# Patient Record
Sex: MALE | Race: WHITE | HISPANIC OR LATINO | Employment: UNEMPLOYED | URBAN - METROPOLITAN AREA
[De-identification: names, ages, dates, MRNs, and addresses within clinical notes are randomized per-mention and may not be internally consistent; named-entity substitution may affect disease eponyms.]

---

## 2019-12-15 ENCOUNTER — APPOINTMENT (OUTPATIENT)
Dept: RADIOLOGY | Facility: MEDICAL CENTER | Age: 54
DRG: 580 | End: 2019-12-15
Attending: EMERGENCY MEDICINE
Payer: MEDICAID

## 2019-12-15 ENCOUNTER — HOSPITAL ENCOUNTER (INPATIENT)
Facility: MEDICAL CENTER | Age: 54
LOS: 8 days | DRG: 580 | End: 2019-12-23
Attending: EMERGENCY MEDICINE | Admitting: HOSPITALIST
Payer: MEDICAID

## 2019-12-15 DIAGNOSIS — L03.116 LEFT LEG CELLULITIS: ICD-10-CM

## 2019-12-15 DIAGNOSIS — L08.9 WOUND INFECTION: ICD-10-CM

## 2019-12-15 DIAGNOSIS — L24.A9 WOUND DRAINAGE: ICD-10-CM

## 2019-12-15 DIAGNOSIS — T14.8XXA WOUND INFECTION: ICD-10-CM

## 2019-12-15 DIAGNOSIS — F11.10 HEROIN ABUSE (HCC): ICD-10-CM

## 2019-12-15 DIAGNOSIS — R78.81 BACTEREMIA DUE TO METHICILLIN RESISTANT STAPHYLOCOCCUS AUREUS: ICD-10-CM

## 2019-12-15 DIAGNOSIS — L03.116 CELLULITIS OF LEFT LOWER EXTREMITY: ICD-10-CM

## 2019-12-15 DIAGNOSIS — B95.62 BACTEREMIA DUE TO METHICILLIN RESISTANT STAPHYLOCOCCUS AUREUS: ICD-10-CM

## 2019-12-15 PROBLEM — L02.416 CUTANEOUS ABSCESS OF LEFT LOWER EXTREMITY: Status: ACTIVE | Noted: 2019-12-15

## 2019-12-15 PROBLEM — L03.90 CELLULITIS: Status: ACTIVE | Noted: 2019-12-15

## 2019-12-15 PROBLEM — E87.1 HYPONATREMIA: Status: ACTIVE | Noted: 2019-12-15

## 2019-12-15 LAB
ALBUMIN SERPL BCP-MCNC: 3.4 G/DL (ref 3.2–4.9)
ALBUMIN/GLOB SERPL: 0.8 G/DL
ALP SERPL-CCNC: 58 U/L (ref 30–99)
ALT SERPL-CCNC: 24 U/L (ref 2–50)
ANION GAP SERPL CALC-SCNC: 6 MMOL/L (ref 0–11.9)
AST SERPL-CCNC: 27 U/L (ref 12–45)
BASOPHILS # BLD AUTO: 0.6 % (ref 0–1.8)
BASOPHILS # BLD: 0.06 K/UL (ref 0–0.12)
BILIRUB SERPL-MCNC: 0.9 MG/DL (ref 0.1–1.5)
BUN SERPL-MCNC: 16 MG/DL (ref 8–22)
CALCIUM SERPL-MCNC: 9.1 MG/DL (ref 8.5–10.5)
CHLORIDE SERPL-SCNC: 98 MMOL/L (ref 96–112)
CO2 SERPL-SCNC: 30 MMOL/L (ref 20–33)
CREAT SERPL-MCNC: 0.76 MG/DL (ref 0.5–1.4)
EOSINOPHIL # BLD AUTO: 0.18 K/UL (ref 0–0.51)
EOSINOPHIL NFR BLD: 1.7 % (ref 0–6.9)
ERYTHROCYTE [DISTWIDTH] IN BLOOD BY AUTOMATED COUNT: 43.8 FL (ref 35.9–50)
GLOBULIN SER CALC-MCNC: 4.4 G/DL (ref 1.9–3.5)
GLUCOSE SERPL-MCNC: 122 MG/DL (ref 65–99)
GRAM STN SPEC: NORMAL
HCT VFR BLD AUTO: 38.6 % (ref 42–52)
HGB BLD-MCNC: 12.7 G/DL (ref 14–18)
IMM GRANULOCYTES # BLD AUTO: 0.13 K/UL (ref 0–0.11)
IMM GRANULOCYTES NFR BLD AUTO: 1.2 % (ref 0–0.9)
LACTATE BLD-SCNC: 1.2 MMOL/L (ref 0.5–2)
LACTATE BLD-SCNC: 1.4 MMOL/L (ref 0.5–2)
LYMPHOCYTES # BLD AUTO: 1.24 K/UL (ref 1–4.8)
LYMPHOCYTES NFR BLD: 11.9 % (ref 22–41)
MCH RBC QN AUTO: 29.6 PG (ref 27–33)
MCHC RBC AUTO-ENTMCNC: 32.9 G/DL (ref 33.7–35.3)
MCV RBC AUTO: 90 FL (ref 81.4–97.8)
MONOCYTES # BLD AUTO: 0.76 K/UL (ref 0–0.85)
MONOCYTES NFR BLD AUTO: 7.3 % (ref 0–13.4)
NEUTROPHILS # BLD AUTO: 8.08 K/UL (ref 1.82–7.42)
NEUTROPHILS NFR BLD: 77.3 % (ref 44–72)
NRBC # BLD AUTO: 0 K/UL
NRBC BLD-RTO: 0 /100 WBC
PLATELET # BLD AUTO: 275 K/UL (ref 164–446)
PMV BLD AUTO: 10 FL (ref 9–12.9)
POTASSIUM SERPL-SCNC: 4.2 MMOL/L (ref 3.6–5.5)
PROT SERPL-MCNC: 7.8 G/DL (ref 6–8.2)
RBC # BLD AUTO: 4.29 M/UL (ref 4.7–6.1)
SIGNIFICANT IND 70042: NORMAL
SITE SITE: NORMAL
SODIUM SERPL-SCNC: 134 MMOL/L (ref 135–145)
SOURCE SOURCE: NORMAL
WBC # BLD AUTO: 10.5 K/UL (ref 4.8–10.8)

## 2019-12-15 PROCEDURE — 700111 HCHG RX REV CODE 636 W/ 250 OVERRIDE (IP): Performed by: EMERGENCY MEDICINE

## 2019-12-15 PROCEDURE — 700117 HCHG RX CONTRAST REV CODE 255: Performed by: EMERGENCY MEDICINE

## 2019-12-15 PROCEDURE — 700111 HCHG RX REV CODE 636 W/ 250 OVERRIDE (IP): Performed by: HOSPITALIST

## 2019-12-15 PROCEDURE — 85025 COMPLETE CBC W/AUTO DIFF WBC: CPT

## 2019-12-15 PROCEDURE — 87150 DNA/RNA AMPLIFIED PROBE: CPT | Mod: 91

## 2019-12-15 PROCEDURE — 96365 THER/PROPH/DIAG IV INF INIT: CPT

## 2019-12-15 PROCEDURE — 87070 CULTURE OTHR SPECIMN AEROBIC: CPT

## 2019-12-15 PROCEDURE — 99223 1ST HOSP IP/OBS HIGH 75: CPT | Performed by: HOSPITALIST

## 2019-12-15 PROCEDURE — 87077 CULTURE AEROBIC IDENTIFY: CPT | Mod: 91

## 2019-12-15 PROCEDURE — 87040 BLOOD CULTURE FOR BACTERIA: CPT

## 2019-12-15 PROCEDURE — 96366 THER/PROPH/DIAG IV INF ADDON: CPT

## 2019-12-15 PROCEDURE — A9270 NON-COVERED ITEM OR SERVICE: HCPCS | Performed by: HOSPITALIST

## 2019-12-15 PROCEDURE — 73701 CT LOWER EXTREMITY W/DYE: CPT | Mod: LT

## 2019-12-15 PROCEDURE — 36415 COLL VENOUS BLD VENIPUNCTURE: CPT

## 2019-12-15 PROCEDURE — 700105 HCHG RX REV CODE 258: Performed by: HOSPITALIST

## 2019-12-15 PROCEDURE — 87186 SC STD MICRODIL/AGAR DIL: CPT | Mod: 91

## 2019-12-15 PROCEDURE — 80053 COMPREHEN METABOLIC PANEL: CPT

## 2019-12-15 PROCEDURE — 99285 EMERGENCY DEPT VISIT HI MDM: CPT

## 2019-12-15 PROCEDURE — 87205 SMEAR GRAM STAIN: CPT

## 2019-12-15 PROCEDURE — 700102 HCHG RX REV CODE 250 W/ 637 OVERRIDE(OP): Performed by: HOSPITALIST

## 2019-12-15 PROCEDURE — 83605 ASSAY OF LACTIC ACID: CPT

## 2019-12-15 PROCEDURE — 700105 HCHG RX REV CODE 258: Performed by: EMERGENCY MEDICINE

## 2019-12-15 PROCEDURE — 770006 HCHG ROOM/CARE - MED/SURG/GYN SEMI*

## 2019-12-15 PROCEDURE — 96367 TX/PROPH/DG ADDL SEQ IV INF: CPT

## 2019-12-15 RX ORDER — AMOXICILLIN 250 MG
2 CAPSULE ORAL 2 TIMES DAILY
Status: DISCONTINUED | OUTPATIENT
Start: 2019-12-15 | End: 2019-12-23 | Stop reason: HOSPADM

## 2019-12-15 RX ORDER — PROCHLORPERAZINE EDISYLATE 5 MG/ML
5-10 INJECTION INTRAMUSCULAR; INTRAVENOUS EVERY 4 HOURS PRN
Status: DISCONTINUED | OUTPATIENT
Start: 2019-12-15 | End: 2019-12-23 | Stop reason: HOSPADM

## 2019-12-15 RX ORDER — OXYCODONE HYDROCHLORIDE 5 MG/1
5 TABLET ORAL
Status: DISCONTINUED | OUTPATIENT
Start: 2019-12-15 | End: 2019-12-23 | Stop reason: HOSPADM

## 2019-12-15 RX ORDER — BISACODYL 10 MG
10 SUPPOSITORY, RECTAL RECTAL
Status: DISCONTINUED | OUTPATIENT
Start: 2019-12-15 | End: 2019-12-23 | Stop reason: HOSPADM

## 2019-12-15 RX ORDER — OXYCODONE HYDROCHLORIDE 10 MG/1
10 TABLET ORAL
Status: DISCONTINUED | OUTPATIENT
Start: 2019-12-15 | End: 2019-12-23 | Stop reason: HOSPADM

## 2019-12-15 RX ORDER — ONDANSETRON 2 MG/ML
4 INJECTION INTRAMUSCULAR; INTRAVENOUS EVERY 4 HOURS PRN
Status: DISCONTINUED | OUTPATIENT
Start: 2019-12-15 | End: 2019-12-23 | Stop reason: HOSPADM

## 2019-12-15 RX ORDER — PROMETHAZINE HYDROCHLORIDE 25 MG/1
12.5-25 TABLET ORAL EVERY 4 HOURS PRN
Status: DISCONTINUED | OUTPATIENT
Start: 2019-12-15 | End: 2019-12-23 | Stop reason: HOSPADM

## 2019-12-15 RX ORDER — ONDANSETRON 4 MG/1
4 TABLET, ORALLY DISINTEGRATING ORAL EVERY 4 HOURS PRN
Status: DISCONTINUED | OUTPATIENT
Start: 2019-12-15 | End: 2019-12-23 | Stop reason: HOSPADM

## 2019-12-15 RX ORDER — MORPHINE SULFATE 4 MG/ML
4 INJECTION, SOLUTION INTRAMUSCULAR; INTRAVENOUS
Status: DISCONTINUED | OUTPATIENT
Start: 2019-12-15 | End: 2019-12-23 | Stop reason: HOSPADM

## 2019-12-15 RX ORDER — ACETAMINOPHEN 325 MG/1
650 TABLET ORAL EVERY 6 HOURS PRN
Status: DISCONTINUED | OUTPATIENT
Start: 2019-12-15 | End: 2019-12-23 | Stop reason: HOSPADM

## 2019-12-15 RX ORDER — POLYETHYLENE GLYCOL 3350 17 G/17G
1 POWDER, FOR SOLUTION ORAL
Status: DISCONTINUED | OUTPATIENT
Start: 2019-12-15 | End: 2019-12-23 | Stop reason: HOSPADM

## 2019-12-15 RX ORDER — PROMETHAZINE HYDROCHLORIDE 25 MG/1
12.5-25 SUPPOSITORY RECTAL EVERY 4 HOURS PRN
Status: DISCONTINUED | OUTPATIENT
Start: 2019-12-15 | End: 2019-12-23 | Stop reason: HOSPADM

## 2019-12-15 RX ADMIN — VANCOMYCIN HYDROCHLORIDE 2400 MG: 500 INJECTION, POWDER, LYOPHILIZED, FOR SOLUTION INTRAVENOUS at 13:24

## 2019-12-15 RX ADMIN — IOHEXOL 80 ML: 350 INJECTION, SOLUTION INTRAVENOUS at 13:50

## 2019-12-15 RX ADMIN — AMPICILLIN SODIUM AND SULBACTAM SODIUM 3 G: 2; 1 INJECTION, POWDER, FOR SOLUTION INTRAMUSCULAR; INTRAVENOUS at 19:03

## 2019-12-15 RX ADMIN — OXYCODONE HYDROCHLORIDE 5 MG: 5 TABLET ORAL at 16:41

## 2019-12-15 RX ADMIN — SENNOSIDES AND DOCUSATE SODIUM 2 TABLET: 8.6; 5 TABLET ORAL at 16:44

## 2019-12-15 RX ADMIN — ENOXAPARIN SODIUM 40 MG: 100 INJECTION SUBCUTANEOUS at 16:41

## 2019-12-15 RX ADMIN — AMPICILLIN SODIUM AND SULBACTAM SODIUM 3 G: 2; 1 INJECTION, POWDER, FOR SOLUTION INTRAMUSCULAR; INTRAVENOUS at 11:54

## 2019-12-15 RX ADMIN — OXYCODONE HYDROCHLORIDE 5 MG: 5 TABLET ORAL at 19:49

## 2019-12-15 RX ADMIN — OXYCODONE HYDROCHLORIDE 10 MG: 10 TABLET ORAL at 22:59

## 2019-12-15 SDOH — HEALTH STABILITY: MENTAL HEALTH: HOW OFTEN DO YOU HAVE A DRINK CONTAINING ALCOHOL?: NEVER

## 2019-12-15 ASSESSMENT — LIFESTYLE VARIABLES
HAVE PEOPLE ANNOYED YOU BY CRITICIZING YOUR DRINKING: NO
TOTAL SCORE: 0
TOTAL SCORE: 0
EVER HAD A DRINK FIRST THING IN THE MORNING TO STEADY YOUR NERVES TO GET RID OF A HANGOVER: NO
EVER FELT BAD OR GUILTY ABOUT YOUR DRINKING: NO
TOTAL SCORE: 0
AVERAGE NUMBER OF DAYS PER WEEK YOU HAVE A DRINK CONTAINING ALCOHOL: 0
EVER_SMOKED: NEVER
ON A TYPICAL DAY WHEN YOU DRINK ALCOHOL HOW MANY DRINKS DO YOU HAVE: 0
HOW MANY TIMES IN THE PAST YEAR HAVE YOU HAD 5 OR MORE DRINKS IN A DAY: 0
HAVE YOU EVER FELT YOU SHOULD CUT DOWN ON YOUR DRINKING: NO
ALCOHOL_USE: NO
CONSUMPTION TOTAL: NEGATIVE

## 2019-12-15 ASSESSMENT — ENCOUNTER SYMPTOMS
HEMOPTYSIS: 0
VOMITING: 0
SPUTUM PRODUCTION: 0
PALPITATIONS: 0
COUGH: 0
DIZZINESS: 0
NAUSEA: 0
CHILLS: 0
ORTHOPNEA: 0

## 2019-12-15 ASSESSMENT — COGNITIVE AND FUNCTIONAL STATUS - GENERAL
SUGGESTED CMS G CODE MODIFIER DAILY ACTIVITY: CI
MOBILITY SCORE: 23
MOVING FROM LYING ON BACK TO SITTING ON SIDE OF FLAT BED: A LITTLE
SUGGESTED CMS G CODE MODIFIER MOBILITY: CI
DRESSING REGULAR LOWER BODY CLOTHING: A LITTLE
DAILY ACTIVITIY SCORE: 23

## 2019-12-15 ASSESSMENT — PATIENT HEALTH QUESTIONNAIRE - PHQ9
SUM OF ALL RESPONSES TO PHQ9 QUESTIONS 1 AND 2: 0
1. LITTLE INTEREST OR PLEASURE IN DOING THINGS: NOT AT ALL
2. FEELING DOWN, DEPRESSED, IRRITABLE, OR HOPELESS: NOT AT ALL

## 2019-12-15 NOTE — PROGRESS NOTES
"Pharmacy Kinetics 53 y.o. male on vancomycin day # 1   12/15/2019    Received Vancomycin 2,400 mg iv loading dose at 13:24 PM on 12/15  Provider specified end date: 19    Indication for Treatment: cellulitis     Pertinent history per medical record: Admitted on 12/15/2019 for SSTI.    Jaime Gunderson is a 53 y.o. male who presented to the ER with cellulitis of the left leg. Her LLE is erythematous and swollen with purulent drainage from his wound.  The patient reports being an IVDU and injects into his medial left thigh, where there are two open weaping wounds. He also has a healing abscess on his right hand where he has been injecting drugs. A CT scan of the LEFT lower extremity was obtained and showed extensive soft tissue induration and/or edema throughout the subcutaneous fat and intramuscular fat planes (no soft tissue abscess or bone erosion identified).    Other antibiotics: ampicillin-sulbactam 3g IV Q6h     Allergies: Patient has no known allergies.     List concerns for renal function: age, IV contrast 12/15     Pertinent cultures to date:   12/15: Blood culture - in process   12/15: Blood culture - in process   12/15: Left leg wound culture - in process     MRSA nares swab if pneumonia is a concern (ordered/positive/negative/n-a): n-a    Recent Labs     12/15/19  1130   WBC 10.5   NEUTSPOLYS 77.30*     Recent Labs     12/15/19  1130   BUN 16   CREATININE 0.76   ALBUMIN 3.4       Intake/Output Summary (Last 24 hours) at 12/15/2019 1527  Last data filed at 12/15/2019 1231  Gross per 24 hour   Intake 100 ml   Output --   Net 100 ml      /64   Pulse 71   Temp 37 °C (98.6 °F) (Temporal)   Resp 20   Ht 1.88 m (6' 2\")   Wt 94.3 kg (208 lb)   SpO2 96%  Temp (24hrs), Av °C (98.6 °F), Min:37 °C (98.6 °F), Max:37 °C (98.6 °F)      A/P   1. Vancomycin dose change: Vancomycin 1,600 mg IV Q12h (01:30, 13:30)  2. Next vancomycin level: Trough prior to the 3rd or 4th total dose (not yet " ordered)  3. Goal trough: 12-16 mcg/mL  4. Comments: Vancomycin ~17 mg/kg IV Q12h initiated for SSTI in an IVDU patient. Concerns for renal accumulation of vancomycin listed above. A trough will be obtained at steady state. Pharmacy will continue to follow and recommend de-escalation of antibiotics as appropriate.     Radha Braun, PharmD, BCPS

## 2019-12-15 NOTE — ED TRIAGE NOTES
Chief Complaint   Patient presents with   • Wound Check     Pt has possible abscess on right forearm.  Pt has two large, open wounds on inside of left thigh with large amount of purulent discharge.  Pt states that he has been using these sites to inject heroin.  Triage process explained to patient.  Pt back to waiting room.  Pt instructed to inform RN if any changes or questions arise.

## 2019-12-15 NOTE — ASSESSMENT & PLAN NOTE
-CT scan negative for abscess or osteomyelitis.   -ID following. Continue abx per their recs.   -Clinically improving. Continue to monitor progression.

## 2019-12-15 NOTE — ED PROVIDER NOTES
ED Provider Note    CHIEF COMPLAINT  Chief Complaint   Patient presents with   • Wound Check       HPI  Jaime Gunderson is a 53 y.o. male who presents with several areas of infection, left lower leg erythematous and swollen with purulent drainage from anterior abrasion/wound.  He states this was a chronic wound that has become infected, occurring months ago when he kicked his shin into a solid object.  He has been injecting his medial left thigh, he has 2 open wounds in this area with purulent drainage and surrounding erythema, associated pain.  He has a healing abscess right hand, again an area where he has been injecting drugs.  No chest pain, no shortness of breath.  Patient states he was told he would need medical clearance prior to undergoing rehab through [facility].  He is here for medical evaluation.    REVIEW OF SYSTEMS  Constitutional: No fever  Respiratory: No shortness of breath  Cardiac: No chest pain or syncope  Gastrointestinal: No abdominal pain  Musculoskeletal: Left leg pain  Neurologic: No headache  Skin: Erythematous change, swelling, prior shooters abscess, purulent drainage       All other systems are negative.     PAST MEDICAL HISTORY  History reviewed. No pertinent past medical history.    FAMILY HISTORY  History reviewed. No pertinent family history.    SOCIAL HISTORY  Social History     Socioeconomic History   • Marital status: Single     Spouse name: Not on file   • Number of children: Not on file   • Years of education: Not on file   • Highest education level: Not on file   Occupational History   • Not on file   Social Needs   • Financial resource strain: Not on file   • Food insecurity:     Worry: Not on file     Inability: Not on file   • Transportation needs:     Medical: Not on file     Non-medical: Not on file   Tobacco Use   • Smoking status: Never Smoker   • Smokeless tobacco: Never Used   Substance and Sexual Activity   • Alcohol use: Never      "Frequency: Never   • Drug use: Yes     Types: Intravenous     Comment: heroin   • Sexual activity: Not on file   Lifestyle   • Physical activity:     Days per week: Not on file     Minutes per session: Not on file   • Stress: Not on file   Relationships   • Social connections:     Talks on phone: Not on file     Gets together: Not on file     Attends Sikhism service: Not on file     Active member of club or organization: Not on file     Attends meetings of clubs or organizations: Not on file     Relationship status: Not on file   • Intimate partner violence:     Fear of current or ex partner: Not on file     Emotionally abused: Not on file     Physically abused: Not on file     Forced sexual activity: Not on file   Other Topics Concern   • Not on file   Social History Narrative   • Not on file       SURGICAL HISTORY  History reviewed. No pertinent surgical history.    CURRENT MEDICATIONS  Home Medications     Reviewed by Porsche Hadley (Pharmacy Tech) on 12/15/19 at 1342  Med List Status: Complete   Medication Last Dose Status        Patient Mario Taking any Medications                       ALLERGIES  No Known Allergies    PHYSICAL EXAM  VITAL SIGNS: /64   Pulse 85   Temp 37 °C (98.6 °F) (Temporal)   Resp 20   Ht 1.88 m (6' 2\")   Wt 94.3 kg (208 lb)   SpO2 97%   BMI 26.71 kg/m²   Constitutional: Nontoxic appearance  ENT: Nares clear, mucous membranes moist.  Eyes:  Conjunctiva normal, No discharge.    Lymphatic: No adenopathy.   Cardiovascular: Normal heart rate, Normal rhythm.  No heart murmur.  Pulmonary: No wheezing, no rales  Gastrointestinal: Abdomen is soft and nontender  Skin: Erythematous change in swelling left leg.  2 circular wounds medial left thigh, appear to be infected.  No palpable fluctuant mass.  Wound anterior left shin with purulent material.  Right hand has circular wound, eschar present, no fluctuant mass to the right hand..   Musculoskeletal:  No CVA tenderness.  Left leg " tenderness  Neurologic:  Normal motor and sensory function, No focal deficits noted.   Psychiatric: Cooperative, normal mood    RADIOLOGY/PROCEDURES/Labs  Results for orders placed or performed during the hospital encounter of 12/15/19   CBC WITH DIFFERENTIAL   Result Value Ref Range    WBC 10.5 4.8 - 10.8 K/uL    RBC 4.29 (L) 4.70 - 6.10 M/uL    Hemoglobin 12.7 (L) 14.0 - 18.0 g/dL    Hematocrit 38.6 (L) 42.0 - 52.0 %    MCV 90.0 81.4 - 97.8 fL    MCH 29.6 27.0 - 33.0 pg    MCHC 32.9 (L) 33.7 - 35.3 g/dL    RDW 43.8 35.9 - 50.0 fL    Platelet Count 275 164 - 446 K/uL    MPV 10.0 9.0 - 12.9 fL    Neutrophils-Polys 77.30 (H) 44.00 - 72.00 %    Lymphocytes 11.90 (L) 22.00 - 41.00 %    Monocytes 7.30 0.00 - 13.40 %    Eosinophils 1.70 0.00 - 6.90 %    Basophils 0.60 0.00 - 1.80 %    Immature Granulocytes 1.20 (H) 0.00 - 0.90 %    Nucleated RBC 0.00 /100 WBC    Neutrophils (Absolute) 8.08 (H) 1.82 - 7.42 K/uL    Lymphs (Absolute) 1.24 1.00 - 4.80 K/uL    Monos (Absolute) 0.76 0.00 - 0.85 K/uL    Eos (Absolute) 0.18 0.00 - 0.51 K/uL    Baso (Absolute) 0.06 0.00 - 0.12 K/uL    Immature Granulocytes (abs) 0.13 (H) 0.00 - 0.11 K/uL    NRBC (Absolute) 0.00 K/uL   COMP METABOLIC PANEL   Result Value Ref Range    Sodium 134 (L) 135 - 145 mmol/L    Potassium 4.2 3.6 - 5.5 mmol/L    Chloride 98 96 - 112 mmol/L    Co2 30 20 - 33 mmol/L    Anion Gap 6.0 0.0 - 11.9    Glucose 122 (H) 65 - 99 mg/dL    Bun 16 8 - 22 mg/dL    Creatinine 0.76 0.50 - 1.40 mg/dL    Calcium 9.1 8.5 - 10.5 mg/dL    AST(SGOT) 27 12 - 45 U/L    ALT(SGPT) 24 2 - 50 U/L    Alkaline Phosphatase 58 30 - 99 U/L    Total Bilirubin 0.9 0.1 - 1.5 mg/dL    Albumin 3.4 3.2 - 4.9 g/dL    Total Protein 7.8 6.0 - 8.2 g/dL    Globulin 4.4 (H) 1.9 - 3.5 g/dL    A-G Ratio 0.8 g/dL   LACTIC ACID   Result Value Ref Range    Lactic Acid 1.2 0.5 - 2.0 mmol/L   LACTIC ACID   Result Value Ref Range    Lactic Acid 1.4 0.5 - 2.0 mmol/L   ESTIMATED GFR   Result Value Ref Range     GFR If African American >60 >60 mL/min/1.73 m 2    GFR If Non African American >60 >60 mL/min/1.73 m 2     CT-EXTREMITY, LOWER WITH LEFT    (Results Pending)         COURSE & MEDICAL DECISION MAKING  Pertinent Labs & Imaging studies reviewed. (See chart for details)  CT scan is obtained of the left leg to rule out deep abscess.  Externally the areas of infection appear to be draining.  Patient started on vancomycin and Unasyn.  Patient has negative lactic acid, normalized vital signs, does not appear septic at this time.  Patient is admitted for IV antibiotic therapy, and ongoing evaluation with pending CT scan of the left leg    FINAL IMPRESSION  1. Wound infection     2. Wound drainage     3. Left leg cellulitis             Electronically signed by: Jony Lopez, 12/15/2019 1:54 PM

## 2019-12-15 NOTE — H&P
Hospital Medicine History & Physical Note    Date of Service  12/15/2019    Primary Care Physician  No primary care provider on file.    Consultants  None    Code Status  Full Code    Chief Complaint  Left leg swelling    History of Presenting Illness  53 y.o. male who presented 12/15/2019 with left leg swelling.    Mr. Gunderson has a history of heroin abuse.  The patient had an injury which occurred approximately a year and a half ago when he injured his left lower leg after bumping into an air conditioner.  This resulted in a nonhealing wound that has caused him quite a bit of trouble.  He in fact blames pain from the wound as the cause of his heroin dependence.  Over this time the wound has occasionally drained blood and purulent-like material.  The area around it has really never been swollen and his leg was not erythematous.  In the past month the patient has developed a more extensive infection of his leg.  He admits to shooting heroin in his medial thigh and he developed 2 large abscesses there which he opened himself.  They are draining purulent material and are extremely painful.  He has developed progressive swelling and discoloration of his entire leg during this time.  He is having difficulty walking.  Pain is relieved with heroin and when he elevates his leg.  He endorses malaise but has not measured a fever at home.    Review of Systems  Review of Systems   Constitutional: Positive for malaise/fatigue. Negative for chills.   Respiratory: Negative for cough, hemoptysis and sputum production.    Cardiovascular: Negative for chest pain, palpitations and orthopnea.   Gastrointestinal: Negative for nausea and vomiting.   Skin: Negative for itching and rash.   Neurological: Negative for dizziness.   All other systems reviewed and are negative.      Past Medical History  None    Surgical History   has no past surgical history on file.     Family History  Mother with diabetes    Social History   reports that he  has never smoked. He has never used smokeless tobacco. He reports current drug use. Drug: Intravenous. He reports that he does not drink alcohol.    Allergies  No Known Allergies    Medications  None       Physical Exam  Temp:  [37 °C (98.6 °F)] 37 °C (98.6 °F)  Pulse:  [76-88] 77  Resp:  [16-22] 22  BP: (106-129)/(58-78) 110/65  SpO2:  [93 %-97 %] 96 %    Physical Exam  Constitutional:       General: He is not in acute distress.     Appearance: Normal appearance. He is normal weight.   HENT:      Head: Normocephalic and atraumatic.      Right Ear: External ear normal.      Left Ear: External ear normal.      Nose: Nose normal.      Mouth/Throat:      Mouth: Mucous membranes are moist.      Pharynx: Oropharynx is clear.   Eyes:      Extraocular Movements: Extraocular movements intact.      Conjunctiva/sclera: Conjunctivae normal.      Pupils: Pupils are equal, round, and reactive to light.   Neck:      Musculoskeletal: Normal range of motion and neck supple.   Cardiovascular:      Rate and Rhythm: Normal rate and regular rhythm.      Pulses: Normal pulses.   Pulmonary:      Effort: Pulmonary effort is normal.      Breath sounds: Normal breath sounds.   Abdominal:      General: Abdomen is flat. Bowel sounds are normal.      Palpations: Abdomen is soft.   Musculoskeletal: Normal range of motion.   Skin:     General: Skin is warm.      Capillary Refill: Capillary refill takes less than 2 seconds.      Coloration: Skin is not jaundiced.      Findings: Erythema present.      Comments: Left lower extremity swollen from mid thigh to ankle  It is warm with purplish discoloration  There are two 2 cm draining abscesses in his medial thigh  Midpoint of his shin there is a chronic appearing wound   Neurological:      General: No focal deficit present.      Mental Status: He is alert and oriented to person, place, and time.      Cranial Nerves: No cranial nerve deficit.      Gait: Gait normal.   Psychiatric:         Mood and  "Affect: Mood normal.         Behavior: Behavior normal.         Laboratory:  Recent Labs     12/15/19  1130   WBC 10.5   RBC 4.29*   HEMOGLOBIN 12.7*   HEMATOCRIT 38.6*   MCV 90.0   MCH 29.6   MCHC 32.9*   RDW 43.8   PLATELETCT 275   MPV 10.0     Recent Labs     12/15/19  1130   SODIUM 134*   POTASSIUM 4.2   CHLORIDE 98   CO2 30   GLUCOSE 122*   BUN 16   CREATININE 0.76   CALCIUM 9.1     Recent Labs     12/15/19  1130   ALTSGPT 24   ASTSGOT 27   ALKPHOSPHAT 58   TBILIRUBIN 0.9   GLUCOSE 122*         No results for input(s): NTPROBNP in the last 72 hours.      No results for input(s): TROPONINT in the last 72 hours.    Urinalysis:    No results found     Imaging:  CT-EXTREMITY, LOWER WITH LEFT   Final Result      1.  Extensive soft tissue induration and/or edema is identified throughout the subcutaneous fat and intramuscular fat planes of the left lower extremity. Findings could be due to cellulitis or edema.      2.  No soft tissue abscess is appreciated.      3.  No bone erosion is identified that would suggest osteomyelitis.      4.  Prominent dilated superficial veins are identified likely related to congestion inflammation or infection.      5.  Enlarged left inguinal lymph node is identified likely due to inflammation or infection.            Assessment/Plan:  I anticipate this patient will require at least two midnights for appropriate medical management, necessitating inpatient admission.    * Cellulitis- (present on admission)  Assessment & Plan  Patient has extensive circumferential cellulitis  He is at risk for infection with resistant organism including MRSA  Admit to inpatient status  IV Unasyn and vancomycin  Dose of vancomycin will be titrated to serum concentration levels to ensure adequate levels and reduce risk of toxicity  Check lower extremity arterial study    Results of CT scan 12/15/2019 reviewed:  \"1.  Extensive soft tissue induration and/or edema is identified throughout the subcutaneous fat " "and intramuscular fat planes of the left lower extremity. Findings could be due to cellulitis or edema.     2.  No soft tissue abscess is appreciated.     3.  No bone erosion is identified that would suggest osteomyelitis.     4.  Prominent dilated superficial veins are identified likely related to congestion inflammation or infection.     5.  Enlarged left inguinal lymph node is identified likely due to inflammation or infection.\"    Cutaneous abscess of left lower extremity- (present on admission)  Assessment & Plan  Patient has 2 relatively large cutaneous abscess on his medial thigh  He opened them himself, they are draining purulent material  CT scan does not demonstrate any sizable abscess  Monitor closely, he may need a formal I&D    Hyponatremia- (present on admission)  Assessment & Plan  Suspect hypovolemic hyponatremia  IV fluids  I have ordered repeat labs to reassess sodium level after hydration    Heroin abuse (HCC)- (present on admission)  Assessment & Plan  Cessation discussed  Patient uses heroin 4 times a day, we discussed that withdrawal during his hospitalization is expected  He will be treated with oxycodone for pain      VTE prophylaxis: lovenox  "

## 2019-12-15 NOTE — ASSESSMENT & PLAN NOTE
Suspect hypovolemic hyponatremia  IV fluids  I have ordered repeat labs to reassess sodium level after hydration

## 2019-12-15 NOTE — ASSESSMENT & PLAN NOTE
-Continue to reiterate the importance of cessation.   -Withdrawal symptoms controlled on twice daily methadone.  Continue to reassess.

## 2019-12-16 ENCOUNTER — APPOINTMENT (OUTPATIENT)
Dept: RADIOLOGY | Facility: MEDICAL CENTER | Age: 54
DRG: 580 | End: 2019-12-16
Attending: NURSE PRACTITIONER
Payer: MEDICAID

## 2019-12-16 ENCOUNTER — APPOINTMENT (OUTPATIENT)
Dept: RADIOLOGY | Facility: MEDICAL CENTER | Age: 54
DRG: 580 | End: 2019-12-16
Attending: HOSPITALIST
Payer: MEDICAID

## 2019-12-16 PROBLEM — R78.81 BACTEREMIA DUE TO METHICILLIN RESISTANT STAPHYLOCOCCUS AUREUS: Status: ACTIVE | Noted: 2019-12-16

## 2019-12-16 PROBLEM — T14.8XXA WOUND INFECTION: Status: ACTIVE | Noted: 2019-12-15

## 2019-12-16 PROBLEM — B95.62 BACTEREMIA DUE TO METHICILLIN RESISTANT STAPHYLOCOCCUS AUREUS: Status: ACTIVE | Noted: 2019-12-16

## 2019-12-16 PROBLEM — E87.1 HYPONATREMIA: Status: RESOLVED | Noted: 2019-12-15 | Resolved: 2019-12-16

## 2019-12-16 PROBLEM — L08.9 WOUND INFECTION: Status: ACTIVE | Noted: 2019-12-15

## 2019-12-16 LAB
ANION GAP SERPL CALC-SCNC: 9 MMOL/L (ref 0–11.9)
BASOPHILS # BLD AUTO: 0.6 % (ref 0–1.8)
BASOPHILS # BLD: 0.05 K/UL (ref 0–0.12)
BUN SERPL-MCNC: 11 MG/DL (ref 8–22)
CALCIUM SERPL-MCNC: 8.9 MG/DL (ref 8.5–10.5)
CHLORIDE SERPL-SCNC: 106 MMOL/L (ref 96–112)
CO2 SERPL-SCNC: 25 MMOL/L (ref 20–33)
CREAT SERPL-MCNC: 0.63 MG/DL (ref 0.5–1.4)
EOSINOPHIL # BLD AUTO: 0.17 K/UL (ref 0–0.51)
EOSINOPHIL NFR BLD: 2.2 % (ref 0–6.9)
ERYTHROCYTE [DISTWIDTH] IN BLOOD BY AUTOMATED COUNT: 44 FL (ref 35.9–50)
GLUCOSE SERPL-MCNC: 102 MG/DL (ref 65–99)
HAV IGM SERPL QL IA: NEGATIVE
HBV CORE IGM SER QL: NEGATIVE
HBV SURFACE AG SER QL: NEGATIVE
HCT VFR BLD AUTO: 40.8 % (ref 42–52)
HCV AB SER QL: REACTIVE
HGB BLD-MCNC: 13.6 G/DL (ref 14–18)
HIV 1+2 AB+HIV1 P24 AG SERPL QL IA: NON REACTIVE
IMM GRANULOCYTES # BLD AUTO: 0.13 K/UL (ref 0–0.11)
IMM GRANULOCYTES NFR BLD AUTO: 1.7 % (ref 0–0.9)
LYMPHOCYTES # BLD AUTO: 1.38 K/UL (ref 1–4.8)
LYMPHOCYTES NFR BLD: 17.9 % (ref 22–41)
MCH RBC QN AUTO: 29.7 PG (ref 27–33)
MCHC RBC AUTO-ENTMCNC: 33.3 G/DL (ref 33.7–35.3)
MCV RBC AUTO: 89.1 FL (ref 81.4–97.8)
MONOCYTES # BLD AUTO: 0.76 K/UL (ref 0–0.85)
MONOCYTES NFR BLD AUTO: 9.9 % (ref 0–13.4)
NEUTROPHILS # BLD AUTO: 5.21 K/UL (ref 1.82–7.42)
NEUTROPHILS NFR BLD: 67.7 % (ref 44–72)
NRBC # BLD AUTO: 0 K/UL
NRBC BLD-RTO: 0 /100 WBC
PLATELET # BLD AUTO: 257 K/UL (ref 164–446)
PMV BLD AUTO: 10.1 FL (ref 9–12.9)
POTASSIUM SERPL-SCNC: 4 MMOL/L (ref 3.6–5.5)
RBC # BLD AUTO: 4.58 M/UL (ref 4.7–6.1)
SODIUM SERPL-SCNC: 140 MMOL/L (ref 135–145)
WBC # BLD AUTO: 7.7 K/UL (ref 4.8–10.8)

## 2019-12-16 PROCEDURE — 72158 MRI LUMBAR SPINE W/O & W/DYE: CPT

## 2019-12-16 PROCEDURE — 700117 HCHG RX CONTRAST REV CODE 255: Performed by: NURSE PRACTITIONER

## 2019-12-16 PROCEDURE — 770006 HCHG ROOM/CARE - MED/SURG/GYN SEMI*

## 2019-12-16 PROCEDURE — 85025 COMPLETE CBC W/AUTO DIFF WBC: CPT

## 2019-12-16 PROCEDURE — 99233 SBSQ HOSP IP/OBS HIGH 50: CPT | Performed by: HOSPITALIST

## 2019-12-16 PROCEDURE — 93922 UPR/L XTREMITY ART 2 LEVELS: CPT | Mod: 26 | Performed by: INTERNAL MEDICINE

## 2019-12-16 PROCEDURE — 36415 COLL VENOUS BLD VENIPUNCTURE: CPT

## 2019-12-16 PROCEDURE — 700111 HCHG RX REV CODE 636 W/ 250 OVERRIDE (IP): Performed by: HOSPITALIST

## 2019-12-16 PROCEDURE — 80048 BASIC METABOLIC PNL TOTAL CA: CPT

## 2019-12-16 PROCEDURE — 87522 HEPATITIS C REVRS TRNSCRPJ: CPT

## 2019-12-16 PROCEDURE — A9270 NON-COVERED ITEM OR SERVICE: HCPCS | Performed by: HOSPITALIST

## 2019-12-16 PROCEDURE — 99255 IP/OBS CONSLTJ NEW/EST HI 80: CPT | Performed by: INTERNAL MEDICINE

## 2019-12-16 PROCEDURE — A9270 NON-COVERED ITEM OR SERVICE: HCPCS | Performed by: NURSE PRACTITIONER

## 2019-12-16 PROCEDURE — 700102 HCHG RX REV CODE 250 W/ 637 OVERRIDE(OP): Performed by: NURSE PRACTITIONER

## 2019-12-16 PROCEDURE — 700105 HCHG RX REV CODE 258: Performed by: HOSPITALIST

## 2019-12-16 PROCEDURE — A9576 INJ PROHANCE MULTIPACK: HCPCS | Performed by: NURSE PRACTITIONER

## 2019-12-16 PROCEDURE — 87389 HIV-1 AG W/HIV-1&-2 AB AG IA: CPT

## 2019-12-16 PROCEDURE — 700102 HCHG RX REV CODE 250 W/ 637 OVERRIDE(OP): Performed by: HOSPITALIST

## 2019-12-16 PROCEDURE — 93922 UPR/L XTREMITY ART 2 LEVELS: CPT

## 2019-12-16 PROCEDURE — 80074 ACUTE HEPATITIS PANEL: CPT

## 2019-12-16 RX ORDER — DIAZEPAM 5 MG/1
5 TABLET ORAL ONCE
Status: ACTIVE | OUTPATIENT
Start: 2019-12-16 | End: 2019-12-17

## 2019-12-16 RX ORDER — METHADONE HYDROCHLORIDE 10 MG/1
10 TABLET ORAL DAILY
Status: DISCONTINUED | OUTPATIENT
Start: 2019-12-16 | End: 2019-12-18

## 2019-12-16 RX ORDER — CEFAZOLIN SODIUM 2 G/100ML
2 INJECTION, SOLUTION INTRAVENOUS EVERY 8 HOURS
Status: DISCONTINUED | OUTPATIENT
Start: 2019-12-16 | End: 2019-12-23 | Stop reason: HOSPADM

## 2019-12-16 RX ADMIN — MORPHINE SULFATE 4 MG: 4 INJECTION INTRAVENOUS at 17:16

## 2019-12-16 RX ADMIN — OXYCODONE HYDROCHLORIDE 10 MG: 10 TABLET ORAL at 15:42

## 2019-12-16 RX ADMIN — AMPICILLIN SODIUM AND SULBACTAM SODIUM 3 G: 2; 1 INJECTION, POWDER, FOR SOLUTION INTRAMUSCULAR; INTRAVENOUS at 05:06

## 2019-12-16 RX ADMIN — AMPICILLIN SODIUM AND SULBACTAM SODIUM 3 G: 2; 1 INJECTION, POWDER, FOR SOLUTION INTRAMUSCULAR; INTRAVENOUS at 00:02

## 2019-12-16 RX ADMIN — GADOTERIDOL 20 ML: 279.3 INJECTION, SOLUTION INTRAVENOUS at 22:20

## 2019-12-16 RX ADMIN — METHADONE HYDROCHLORIDE 10 MG: 10 TABLET ORAL at 09:30

## 2019-12-16 RX ADMIN — OXYCODONE HYDROCHLORIDE 10 MG: 10 TABLET ORAL at 05:06

## 2019-12-16 RX ADMIN — OXYCODONE HYDROCHLORIDE 10 MG: 10 TABLET ORAL at 22:43

## 2019-12-16 RX ADMIN — OXYCODONE HYDROCHLORIDE 10 MG: 10 TABLET ORAL at 19:50

## 2019-12-16 RX ADMIN — SENNOSIDES AND DOCUSATE SODIUM 2 TABLET: 8.6; 5 TABLET ORAL at 05:06

## 2019-12-16 RX ADMIN — OXYCODONE HYDROCHLORIDE 10 MG: 10 TABLET ORAL at 01:58

## 2019-12-16 RX ADMIN — CEFAZOLIN SODIUM 2 G: 2 INJECTION, SOLUTION INTRAVENOUS at 12:53

## 2019-12-16 RX ADMIN — CEFAZOLIN SODIUM 2 G: 2 INJECTION, SOLUTION INTRAVENOUS at 20:55

## 2019-12-16 RX ADMIN — MORPHINE SULFATE 4 MG: 4 INJECTION INTRAVENOUS at 05:55

## 2019-12-16 RX ADMIN — VANCOMYCIN HYDROCHLORIDE 1600 MG: 500 INJECTION, POWDER, LYOPHILIZED, FOR SOLUTION INTRAVENOUS at 01:59

## 2019-12-16 ASSESSMENT — ENCOUNTER SYMPTOMS
SPEECH CHANGE: 0
INSOMNIA: 0
MYALGIAS: 0
HEADACHES: 0
CHILLS: 1
BLOOD IN STOOL: 0
ABDOMINAL PAIN: 0
CONSTIPATION: 0
COUGH: 0
SHORTNESS OF BREATH: 0
DIARRHEA: 0
NAUSEA: 0
FOCAL WEAKNESS: 0
WHEEZING: 0
WEAKNESS: 0
DIAPHORESIS: 0
MYALGIAS: 1
DOUBLE VISION: 0
SENSORY CHANGE: 0
ORTHOPNEA: 0
SEIZURES: 0
DEPRESSION: 0
PND: 0
BACK PAIN: 1
FEVER: 0
ROS GI COMMENTS: +HUNGRY
CHILLS: 0
DIZZINESS: 0
PALPITATIONS: 0
SPUTUM PRODUCTION: 0
BLURRED VISION: 0
VOMITING: 0
NERVOUS/ANXIOUS: 0

## 2019-12-16 ASSESSMENT — LIFESTYLE VARIABLES: SUBSTANCE_ABUSE: 1

## 2019-12-16 NOTE — DISCHARGE PLANNING
Patient is eligible for Medicaid Meds to Beds at discharge if they have coverage with [place] Medicaid, Medicaid FFS, Medicaid HMO ([facility]), or [place]. This service is provided through the [facility] if orders are received by the pharmacy prior to 4pm Monday through Friday excluding holidays. Preferred pharmacy has been changed to [facility]. Please call [phone] prior to discharge.

## 2019-12-16 NOTE — PROGRESS NOTES
Microbiology called with positive wound culture. Wound culture positive for Group A strep and Staph Aureus.   Dr. Antunez aware and notified.

## 2019-12-16 NOTE — CARE PLAN
Problem: Communication  Goal: The ability to communicate needs accurately and effectively will improve  Outcome: PROGRESSING AS EXPECTED  Intervention: [place] patient and significant other/support system to call light to alert staff of needs  Flowsheets (Taken 12/15/2019 1809)  Oriented to:: All of the Following : Location of Bathroom, Visiting Policy, Unit Routine, Call Light and Bedside Controls, Bedside Rail Policy, Smoking Policy, Rights and Responsibilities, Bedside Report, and Patient Education Notebook; Visiting Policy; Location of bathroom; Unit Routine; Call Light & Bedside Controls  Note:   Oriented to [address]. All concerns addressed. No further needs

## 2019-12-16 NOTE — CONSULTS
Consults   INFECTIOUS DISEASES INPATIENT CONSULT NOTE     Date of Service: 12/16/2019    Consult Requested By: Claribel Antunez M.D.    Reason for Consultation: MSSA bacteremia    History of Present Illness:   Jaime Gunderson is a 53 y.o.  admitted 12/15/2019. Pt has a past medical history of injury to left lower leg with chronic nonhealing wound as well as active IV drug use with heroin.  He has a history of injecting into veins of right arm as well as left leg.  He states he had a recent abscess on his right arm which he cut open himself and drained and it is healed.  He then had a similar appearance on his left thigh with edema, erythema and drainage at injection site.  He then used a razor blade and cut open the wound and attempt to drain it.  However, the left thigh had increasing erythema pain and drainage.  His symptoms been ongoing for approximately 7 days and due to worsening wound on his left leg he presented to the ER.    Hospital Course:   The patient is been afebrile and no significant leukocytosis.  Blood cultures on 12/15 1/2+ for MSSA.  See below for details.  He states that he has been withdrawing but symptoms improved with methadone. He was admitted and started on vancomycin and Unasyn.  Now transition to cefazolin.       Review Of Systems:  Review of Systems   Constitutional: Negative for chills, fever and malaise/fatigue.   HENT: Negative for hearing loss.    Eyes: Negative for blurred vision and double vision.   Respiratory: Negative for cough, sputum production and shortness of breath.    Cardiovascular: Positive for leg swelling. Negative for chest pain.   Gastrointestinal: Negative for abdominal pain, constipation, diarrhea, nausea and vomiting.   Genitourinary: Negative for dysuria.   Musculoskeletal: Positive for back pain and myalgias. Negative for joint pain.   Skin: Negative for itching and rash.   Neurological: Negative for sensory change, focal weakness and weakness.    Psychiatric/Behavioral: Positive for substance abuse. The patient is not nervous/anxious.          PMH:   History reviewed. No pertinent past medical history.    PSH:  History reviewed. No pertinent surgical history.    FAMILY HX:  History reviewed. No pertinent family history.    SOCIAL HX:  Social History     Socioeconomic History   • Marital status: Single     Spouse name: Not on file   • Number of children: Not on file   • Years of education: Not on file   • Highest education level: Not on file   Occupational History   • Not on file   Social Needs   • Financial resource strain: Not on file   • Food insecurity:     Worry: Not on file     Inability: Not on file   • Transportation needs:     Medical: Not on file     Non-medical: Not on file   Tobacco Use   • Smoking status: Never Smoker   • Smokeless tobacco: Never Used   Substance and Sexual Activity   • Alcohol use: Never     Frequency: Never   • Drug use: Yes     Types: Intravenous     Comment: heroin   • Sexual activity: Not on file   Lifestyle   • Physical activity:     Days per week: Not on file     Minutes per session: Not on file   • Stress: Not on file   Relationships   • Social connections:     Talks on phone: Not on file     Gets together: Not on file     Attends Gnosticist service: Not on file     Active member of club or organization: Not on file     Attends meetings of clubs or organizations: Not on file     Relationship status: Not on file   • Intimate partner violence:     Fear of current or ex partner: Not on file     Emotionally abused: Not on file     Physically abused: Not on file     Forced sexual activity: Not on file   Other Topics Concern   • Not on file   Social History Narrative   • Not on file     Social History     Tobacco Use   Smoking Status Never Smoker   Smokeless Tobacco Never Used     Social History     Substance and Sexual Activity   Alcohol Use Never   • Frequency: Never       Allergies/Intolerances:  No Known  Allergies    History reviewed with the patient     Other Current Medications:    Current Facility-Administered Medications:   •  methadone (DOLOPHINE) tablet 10 mg, 10 mg, Oral, DAILY, Tona Caba, A.P.R.N., 10 mg at 12/16/19 0930  •  ceFAZolin in dextrose (ANCEF) IVPB premix 2 g, 2 g, Intravenous, Q8HRS, Claribel Antunez M.D., Stopped at 12/16/19 1323  •  diazePAM (VALIUM) tablet 5 mg, 5 mg, Oral, Once, Tona Caba, A.P.R.N., Stopped at 12/16/19 1300  •  senna-docusate (PERICOLACE or SENOKOT S) 8.6-50 MG per tablet 2 Tab, 2 Tab, Oral, BID, 2 Tab at 12/16/19 0506 **AND** polyethylene glycol/lytes (MIRALAX) PACKET 1 Packet, 1 Packet, Oral, QDAY PRN **AND** magnesium hydroxide (MILK OF MAGNESIA) suspension 30 mL, 30 mL, Oral, QDAY PRN **AND** bisacodyl (DULCOLAX) suppository 10 mg, 10 mg, Rectal, QDAY PRN, Junito Gonzales M.D.  •  enoxaparin (LOVENOX) inj 40 mg, 40 mg, Subcutaneous, DAILY, Junito Gonzales M.D., Stopped at 12/16/19 0600  •  acetaminophen (TYLENOL) tablet 650 mg, 650 mg, Oral, Q6HRS PRN, Junito Gonzales M.D.  •  Notify provider if pain remains uncontrolled, , , CONTINUOUS **AND** Use the numeric rating scale (NRS-11) on regular floors and Critical-Care Pain Observation Tool (CPOT) on ICUs/Trauma to assess pain, , , CONTINUOUS **AND** Pulse Ox (Oximetry), , , CONTINUOUS **AND** Pharmacy Consult Request ...Pain Management Review 1 Each, 1 Each, Other, PHARMACY TO DOSE **AND** If patient difficult to arouse and/or has respiratory depression, stop any opiates that are currently infusing and call a Rapid Response., , , CONTINUOUS **AND** oxyCODONE immediate-release (ROXICODONE) tablet 5 mg, 5 mg, Oral, Q3HRS PRN, 5 mg at 12/15/19 1949 **AND** oxyCODONE immediate-release (ROXICODONE) tablet 10 mg, 10 mg, Oral, Q3HRS PRN, 10 mg at 12/16/19 0506 **AND** morphine (pf) 4 MG/ML injection 4 mg, 4 mg, Intravenous, Q3HRS PRN, Junito Gonzales M.D., 4 mg at 12/16/19 0533  •  ondansetron (ZOFRAN) syringe/vial  "injection 4 mg, 4 mg, Intravenous, Q4HRS PRN, Junito Gonzales M.D.  •  ondansetron (ZOFRAN ODT) dispertab 4 mg, 4 mg, Oral, Q4HRS PRN, Junito Gonzales M.D.  •  promethazine (PHENERGAN) tablet 12.5-25 mg, 12.5-25 mg, Oral, Q4HRS PRN, Junito Gonzales M.D.  •  promethazine (PHENERGAN) suppository 12.5-25 mg, 12.5-25 mg, Rectal, Q4HRS PRN, Junito Gonzales M.D.  •  prochlorperazine (COMPAZINE) injection 5-10 mg, 5-10 mg, Intravenous, Q4HRS PRN, Junito Gonzales M.D.  [unfilled]    Most Recent Vital Signs:  /74   Pulse (!) 58 Comment: RN notifed  Temp 36.4 °C (97.5 °F) (Temporal)   Resp 16   Ht 1.88 m (6' 2\")   Wt 94.3 kg (208 lb)   SpO2 98%   BMI 26.71 kg/m²   Temp  Av.9 °C (98.5 °F)  Min: 36.4 °C (97.5 °F)  Max: 37.4 °C (99.3 °F)    Physical Exam:  Physical Exam   Constitutional: He is oriented to person, place, and time and well-developed, well-nourished, and in no distress.   HENT:   Head: Normocephalic and atraumatic.   Eyes: Pupils are equal, round, and reactive to light. Conjunctivae and EOM are normal. Right eye exhibits no discharge. Left eye exhibits no discharge.   Cardiovascular: Normal rate, regular rhythm and normal heart sounds.   Pulmonary/Chest: Effort normal and breath sounds normal.   Abdominal: Soft. Bowel sounds are normal. He exhibits no distension. There is no tenderness. There is no rebound and no guarding.   Musculoskeletal:         General: Tenderness and edema present.      Comments: Left thigh with 2 large wounds, purulent drainage, surrounding erythema and edema, tender to palpation.  Left lower extremity with erythema, tenderness and scabbed over wound.    Neurological: He is oriented to person, place, and time.   Skin: Skin is warm and dry.   Psychiatric: Judgment normal.           Pertinent Lab Results:  Recent Labs     12/15/19  1130 19  0258   WBC 10.5 7.7      Recent Labs     12/15/19  1130 19  0258   HEMOGLOBIN 12.7* 13.6*   HEMATOCRIT 38.6* 40.8*   MCV 90.0 " "89.1   MCH 29.6 29.7   PLATELETCT 275 257         Recent Labs     12/15/19  1130 12/16/19  0258   SODIUM 134* 140   POTASSIUM 4.2 4.0   CHLORIDE 98 106   CO2 30 25   CREATININE 0.76 0.63        Recent Labs     12/15/19  1130   ALBUMIN 3.4        Pertinent Micro:  Results     Procedure Component Value Units Date/Time    BLOOD CULTURE [[id number]]  (Abnormal) Collected:  12/15/19 1130    Order Status:  Completed Specimen:  Blood from Peripheral Updated:  12/16/19 1024     Significant Indicator POS     Source BLD     Site PERIPHERAL     Culture Result Growth detected by Bactec instrument. 12/16/2019  10:18  Gram Stain: Gram positive cocci: Possible Staphylococcus sp.  Staphylococcus aureus (methicillin sensitive)  detected by PCR.  Susceptibility to follow.      Narrative:       CALL  [facility]  MS5 tel. [phone],  CALLED  MS5 tel. [phone] 12/16/2019, 10:24, called [phone] Dionne  2 of 2 blood culture x2  Sites order. Per Hospital Policy:  Only change Specimen Src: to \"Line\" if specified by physician  order.  No site indicated    BLOOD CULTURE [[id number]] Collected:  12/15/19 1151    Order Status:  Completed Specimen:  Blood from Peripheral Updated:  12/16/19 0946     Significant Indicator NEG     Source BLD     Site PERIPHERAL     Culture Result No Growth  Note: Blood cultures are incubated for 5 days and  are monitored continuously.Positive blood cultures  are called to the RN and reported as soon as  they are identified.      Narrative:       1 of 2 for Blood Culture x 2 sites order. Per Hospital  Policy: Only change Specimen Src: to \"Line\" if specified by  physician order.  Left Forearm/Arm    GRAM STAIN [[id number]] Collected:  12/15/19 1115    Order Status:  Completed Specimen:  Wound Updated:  12/15/19 2120     Significant Indicator .     Source WND     Site LEFT LEG     Gram Stain Result Moderate WBCs.  Many Gram positive cocci.      Culture Wound W/ Gram Stain [[id number]] Collected:  12/15/19 1115    Order Status:  " Completed Specimen:  Wound from Left Leg Updated:  12/15/19 1130        No results found for: BLOODCULTU, BLDCULT, BCHOLD     Studies:  Ct-extremity, Lower With Left    Result Date: 12/15/2019  12/15/2019 1:25 PM HISTORY/REASON FOR EXAM:  Left leg swelling and redness. TECHNIQUE/EXAM DESCRIPTION AND NUMBER OF VIEWS:  CT scan of the LEFT lower extremity with contrast, with reconstructions. Thin helical 3 mm sections were obtained from the distal femur through the proximal tibia/fibula. Sagittal and coronal multiplanar reconstructions were generated from the axial images. A total of 80 mL of Omnipaque 350 nonionic contrast was administered IV without complication. Up to date radiation dose reduction adjustments have been utilized to meet ALARA standards for radiation dose reduction. COMPARISON: None. FINDINGS: There is abnormally increased attenuation throughout the subcutaneous fat and intramuscular fat planes in the left lower extremity. This process is most prominent in the lower leg area below the level of the knee and present to a lesser degree in the thigh region. Prominent dilated veins are identified which are most prominent superficially and medially. No localized fluid collection that would suggest abscess is identified. No bone erosion or bone destruction is identified. Enlarged lymph node in the left inguinal area is identified with short axis diameter 1.8 cm.     1.  Extensive soft tissue induration and/or edema is identified throughout the subcutaneous fat and intramuscular fat planes of the left lower extremity. Findings could be due to cellulitis or edema. 2.  No soft tissue abscess is appreciated. 3.  No bone erosion is identified that would suggest osteomyelitis. 4.  Prominent dilated superficial veins are identified likely related to congestion inflammation or infection. 5.  Enlarged left inguinal lymph node is identified likely due to inflammation or infection.    Us-steve Single Level  Bilat    Result Date: 2019   Vascular Laboratory  Conclusions  No evidence of arterial insufficiency.  KENDRICK ATWOOD  Age:    53    Gender:     M  MRN:    [id number]  :    1965      BSA:  Exam Date:     2019 08:10  Room #:     Inpatient  Priority:     Routine  Ht (in):             Wt (lb):  Ordering Physician:        VALERIE AVALOS  Referring Physician:       VALERIE AVALOS  Sonographer:               Morales Suggs RVT, RDMS  Study Type:                Complete Bilateral  Technical Quality:         Adequate  Indications:     Atherosclerosis of native arteries of left leg with                   ulceration of unspecified site  CPT Codes:       36478  ICD Codes:       I70.249  History:         Nonhealing wound of left lower extremity  Limitations:     Unable to obtain right brachial pressure due to line in arm,                    Unable to obtain left popliteal waveform due to wound                   dressing                 RIGHT  Waveform            Systolic BPs (mmHg)                                           Brachial  Triphasic                                Common Femoral  Triphasic                  135           Posterior Tibial  Triphasic                  137           Dorsalis Pedis                                           Peroneal                             1.18          DIANE                                           TBI                       LEFT  Waveform        Systolic BPs (mmHg)                             116           Brachial  Triphasic                                Common Femoral  Triphasic                  132           Posterior Tibial  Triphasic                  134           Dorsalis Pedis                                           Peroneal                             1.16          DIANE                                           TBI  Findings  Bilateral.  Doppler waveform of the common femoral artery is of high amplitude and  triphasic.  Doppler  waveforms at the ankle are brisk and triphasic.  Ankle-brachial index is normal.  Additional testing was not performed in accordance with lower extremity  arterial evaluation protocol.  Charan Cruz MD  (Electronically Signed)  Final Date:      2019                   10:58    Us-extremity Artery Lower Unilat W/steve (combo) Left    Result Date: 2019   Vascular Laboratory  Conclusions  KENDRICK ATWOOD  Age:    53    Gender:     M  MRN:    [id number]  :    1965      BSA:  Exam Date:     2019 08:10  Room #:     Inpatient  Priority:     Routine  Ht (in):             Wt (lb):  Ordering Physician:        VALEREI AVALOS  Referring Physician:       [id number] Underwood  Sonographer:               Morales Suggs RVT, RDMS  Study Type:                Complete Bilateral  Technical Quality:         Adequate  Indications:     Atherosclerosis of native arteries of left leg with                   ulceration of unspecified site  CPT Codes:       58801  ICD Codes:       I70.249  History:         Nonhealing wound of left lower extremity  Limitations:     Unable to obtain right brachial pressure due to line in arm,                    Unable to obtain left popliteal waveform due to wound                   dressing                 RIGHT  Waveform            Systolic BPs (mmHg)                                           Brachial  Triphasic                                Common Femoral  Triphasic                  135           Posterior Tibial  Triphasic                  137           Dorsalis Pedis                                           Peroneal                             1.18          STEVE                                           TBI                       LEFT  Waveform        Systolic BPs (mmHg)                             116           Brachial  Triphasic                                Common Femoral  Triphasic                  132           Posterior Tibial  Triphasic                   134           Dorsalis Pedis                                           Peroneal                             1.16          DIANE                                           TBI  Findings  Bilateral.  Doppler waveform of the common femoral artery is of high amplitude and  triphasic.  Doppler waveforms at the ankle are brisk and triphasic.  Ankle-brachial index is normal.  Additional testing was not performed in accordance with lower extremity  arterial evaluation protocol.       ASSESSMENT/PLAN:     Jaime Gunderson is a 53 y.o.  admitted 12/15/2019. Pt has a past medical history of injury to left lower leg with chronic nonhealing wound as well as active IV drug use with heroin.  He has a history of injecting into veins of right arm as well as left leg.  He states he had a recent abscess on his right arm which he cut open himself and drained and it is healed.  He then had a similar appearance on his left thigh with edema, erythema and drainage at injection site.  He then used a razor blade and cut open the wound and attempt to drain it.  However, the left thigh had increasing erythema pain and drainage.  His symptoms been ongoing for approximately 7 days and due to worsening wound on his left leg he presented to the ER.    Hospital Course:   The patient is been afebrile and no significant leukocytosis.  Blood cultures on 12/15 1/2+ for MSSA.  See below for details.  He was admitted and started on vancomycin and Unasyn.  Now transition to cefazolin.       MSSA bacteremia  Left thigh abscess  -Both secondary to IV drug use  -CT left leg on 12/15 with edema, no osteomyelitis, no abscess    IV drug use, active, heroin  Injury to left lower leg with chronic nonhealing wounds which have worsened recently  Injury to lumbar spine, chronic back    Known hepatitis C antibody positive    --- We will stop vancomycin and Unasyn and transition to cefazolin  --- Agree with repeat blood cultures, ordered  --- Agree  with TTE, ordered  --- Recommend MRI lumbar spine with contrast as the patient has back pain and point tenderness on exam in the setting of MSSA bacteremia  --- Wound care for left leg  --- Will order hepatitis C PCR quant  --- We will order acute hepatitis panel in setting of active IV drug use  --- Will order HIV screen      Plan of care discussed with FRANCISCO Caba. Will continue to follow    Stephanie Perez M.D.

## 2019-12-16 NOTE — DIETARY
Nutrition Services: Malnutrition Screening score 1 on admit. Pt noted to have had poor po intake PTA without any associated wt change.   Nutrition assessment not indicated at this time. RD will monitor per department guidelines. Please consult RD for nutrition concerns.

## 2019-12-16 NOTE — PROGRESS NOTES
"Pharmacy Kinetics 53 y.o. male on vancomycin day # 2 2019    Currently on Vancomycin 1600 mg iv q12hr (0200, 14:00)  Provider specified end date:     Indication for Treatment: cellulitis    Pertinent history per medical record: Admitted on 12/15/2019 for SSTI. 53 y.o. male who presented to the ER with cellulitis of the left leg; LLE is erythematous and swollen with purulent drainage from his wound.  The patient reports being an IVDU and injects into his medial left thigh, where there are two open weaping wounds. He also has a healing abscess on his right hand where he has been injecting drugs. A CT scan of the LEFT lower extremity was obtained and showed extensive soft tissue induration and/or edema throughout the subcutaneous fat and intramuscular fat planes (no soft tissue abscess or bone erosion identified).    Other antibiotics: Unasyn 3 g IV q6h    Allergies: Patient has no known allergies.     List concerns for renal function (possible concerns include abnormal LFTs, BUN/SCr ratio > 20:1, CHF, obesity, malnutrition/low albumin, hypermetabolic state (SIRS), pressors/hypotension, nephrotoxic drugs, etc.): IV contrast 12/15    Pertinent cultures to date:   12/15 Left leg wound - many GPC  12/15 PBC x2 - in process    MRSA nares swab if pneumonia is a concern (ordered/positive/negative/n-a): n/a    Recent Labs     12/15/19  1130 19  0258   WBC 10.5 7.7   NEUTSPOLYS 77.30* 67.70     Recent Labs     12/15/19  1130 19  0258   BUN 16 11   CREATININE 0.76 0.63   ALBUMIN 3.4  --      No results for input(s): VANCOTROUGH, VANCOPEAK, VANCORANDOM in the last 72 hours.    Intake/Output Summary (Last 24 hours) at 2019 0856  Last data filed at 2019 0555  Gross per 24 hour   Intake 900 ml   Output 3200 ml   Net -2300 ml      /74   Pulse (!) 58   Temp 36.4 °C (97.5 °F) (Temporal)   Resp 16   Ht 1.88 m (6' 2\")   Wt 94.3 kg (208 lb)   SpO2 98%  Temp (24hrs), Av.9 °C (98.5 °F), " Min:36.4 °C (97.5 °F), Max:37.4 °C (99.3 °F)      A/P   1. Vancomycin dose change: continue same  2. Next vancomycin level: prior 4th total dose tomorrow 12/17 (01:30)  3. Goal trough: 12-16  4. Comments: Renal function stable, continue current dosing regimen.  Trough ordered prior 4th total dose tomorrow. Clinical pharmacist will dose adjust based off level in AM. Cultures pending, will follow and adjust if necessary once finalized.    Georgette Rivera, Pharm.D, BCPS, BCCP

## 2019-12-16 NOTE — ASSESSMENT & PLAN NOTE
Patient has 2 relatively large cutaneous abscess on his medial thigh  He opened them himself, they are draining purulent material  CT scan does not demonstrate any sizable abscess  Monitor closely, he may need a formal I&D

## 2019-12-16 NOTE — PROGRESS NOTES
Lab called with positive BCX of gram + cocci, possible staph. Dr. Antunez aware and notified. New ABX ordered.

## 2019-12-16 NOTE — CARE PLAN
Problem: Communication  Goal: The ability to communicate needs accurately and effectively will improve  Outcome: PROGRESSING AS EXPECTED  Intervention: Educate patient and significant other/support system about the plan of care, procedures, treatments, medications and allow for questions  Note:   Pt educated on POC. MRI and, Echo and US- DIANE ordered. Pt aware and verbalized understanding.      Problem: Psychosocial Needs:  Goal: Level of anxiety will decrease  Outcome: PROGRESSING AS EXPECTED  Note:   Lights turned low, noise minimized to relieve pt anxiety and promote rest. Pt calm and cooperative.

## 2019-12-16 NOTE — PROGRESS NOTES
Received report, assumed pt care. Pt a&o x 4, VSS, Assessment completed. Resting comfortably in bed with call light, bedside table in reach. Pt complains of 6/10 left leg pain.  Pain medication recently administered and not yet due at this time. Left shin scabbed and dry, left thigh gauze dressing in  Place. Dressing CDI.1 + left leg edema, left leg elevated for comfort and to reduce swelling. Left wrist wound scabbed and dry, CHARI. Side rails up x 2. Instructed to use call light when needing to get OOB verbalized understanding. Bed alarm refused, pt calls for assistance appropriately,  bed in low position. Family at bedside. Will continue to monitor.

## 2019-12-16 NOTE — ASSESSMENT & PLAN NOTE
-CT scan negative for abscess or osteomyelitis.   -S/p surgical I&D by Dr. Ward on 12/18/2018.  Intraoperative cultures positive for S. viridans. No further surgical plans per orthopedic surgery. Sutures to be removed in 2 weeks (appox 1/1/2020).   -ID following.  Continue antibiotics per their recommendations.  -Wound culture + MSSA & group A strep.   -Continue wound care.

## 2019-12-16 NOTE — PROGRESS NOTES
"Hospital Medicine Daily Progress Note    Date of Service  12/16/2019    Chief Complaint  Wound check    Hospital Course   Mr. Gunderson is a 52 y/o male who presented to the ED on 12/15/19 with a possible abscess to his right forearm in addition to 2 large open wounds on the inside of his left thigh with a large amount of purulent drainage where he has been injecting heroin. He was told he would need medical clearance prior to undergoing rehab through [facility]. A CT scan was done and showed extensive soft tissue induration and/or edema throughout the subcutaneous fat and intramuscular fat planes of the left lower extremity which could be due to cellulitis or edema. There was no appreciable abscess or evidence of osteomyelitis.         Interval Problem Update  Feels like he is going into withdrawals. Complains of feeling shaky, restless, chills, & hungry.     Labs today are unremarkable. Lactic acid normal. Blood cultures prelim + for MSSA, changed abx to ancef & consulted ID.    Afebrile overnight, HR 50s-60s, SBP 100s-teens, O2 sats WNL on room air.     TTE pending. If negative will do BAILEY.  MRI L-spine pending.  Wound consult pending, may need surgical I&D.      Consultants/Specialty  ID    Code Status  Full code    Disposition  Clinical for now.     Review of Systems  Review of Systems   Constitutional: Positive for chills. Negative for diaphoresis, fever and malaise/fatigue.        Feels on edge, \"like I'm starting to go through withdrawals\"   HENT: Negative for congestion.    Respiratory: Negative for cough, shortness of breath and wheezing.    Cardiovascular: Negative for chest pain, palpitations, orthopnea, leg swelling and PND.   Gastrointestinal: Negative for abdominal pain, blood in stool, constipation, diarrhea, nausea and vomiting.        +hungry   Genitourinary: Negative for dysuria, frequency, hematuria and urgency.   Musculoskeletal: Positive for back pain (lumbar). Negative for " joint pain and myalgias.   Skin: Negative for rash.        + multiple wounds throughout his body (left anterior shin, right forearm, left inner thigh); purulent drainage noted from inner thigh wounds   Neurological: Negative for dizziness, sensory change, speech change, focal weakness, seizures, weakness and headaches.        + shaky   Psychiatric/Behavioral: Positive for substance abuse (IV heroin). Negative for depression. The patient is not nervous/anxious and does not have insomnia.    All other systems reviewed and are negative.     Physical Exam  Temp:  [36.4 °C (97.5 °F)-37.4 °C (99.3 °F)] 36.4 °C (97.5 °F)  Pulse:  [58-82] 58  Resp:  [16-20] 16  BP: (103-122)/(64-83) 106/74  SpO2:  [96 %-99 %] 98 %    Physical Exam  Vitals signs and nursing note reviewed.   Constitutional:       General: He is awake. He is not in acute distress.     Appearance: Normal appearance. He is well-developed and overweight. He is not ill-appearing.   HENT:      Head: Normocephalic and atraumatic.      Mouth/Throat:      Lips: Pink.      Mouth: Mucous membranes are moist.   Eyes:      Conjunctiva/sclera: Conjunctivae normal.      Pupils: Pupils are equal, round, and reactive to light.   Neck:      Musculoskeletal: Normal range of motion and neck supple.      Vascular: No JVD.   Cardiovascular:      Rate and Rhythm: Normal rate and regular rhythm.      Pulses: Normal pulses.      Heart sounds: Normal heart sounds.   Pulmonary:      Effort: Pulmonary effort is normal.      Breath sounds: Normal breath sounds.   Abdominal:      General: Bowel sounds are normal. There is no distension or abdominal bruit.      Palpations: Abdomen is soft.      Tenderness: There is no tenderness.   Musculoskeletal:      Lumbar back: He exhibits tenderness, bony tenderness and pain.      Right lower leg: No edema.      Left lower leg: No edema.   Skin:     General: Skin is warm and dry.          Neurological:      General: No focal deficit present.       Mental Status: He is alert and oriented to person, place, and time.      GCS: GCS eye subscore is 4. GCS verbal subscore is 5. GCS motor subscore is 6.      Cranial Nerves: Cranial nerves are intact.      Sensory: Sensation is intact.      Motor: Motor function is intact.      Coordination: Coordination is intact.      Gait: Gait is intact.   Psychiatric:         Attention and Perception: Attention and perception normal.         Mood and Affect: Affect normal. Mood is anxious.         Speech: Speech normal.         Behavior: Behavior normal. Behavior is cooperative.         Thought Content: Thought content normal.         Cognition and Memory: Cognition and memory normal.         Judgment: Judgment normal.     Fluids    Intake/Output Summary (Last 24 hours) at 12/16/2019 1411  Last data filed at 12/16/2019 1323  Gross per 24 hour   Intake 900 ml   Output 3650 ml   Net -2750 ml     Laboratory  Recent Labs     12/15/19  1130 12/16/19  0258   WBC 10.5 7.7   RBC 4.29* 4.58*   HEMOGLOBIN 12.7* 13.6*   HEMATOCRIT 38.6* 40.8*   MCV 90.0 89.1   MCH 29.6 29.7   MCHC 32.9* 33.3*   RDW 43.8 44.0   PLATELETCT 275 257   MPV 10.0 10.1     Recent Labs     12/15/19  1130 12/16/19  0258   SODIUM 134* 140   POTASSIUM 4.2 4.0   CHLORIDE 98 106   CO2 30 25   GLUCOSE 122* 102*   BUN 16 11   CREATININE 0.76 0.63   CALCIUM 9.1 8.9     Imaging  US-DIANE SINGLE LEVEL BILAT   Final Result      US-EXTREMITY ARTERY LOWER UNILAT W/DIANE (COMBO) LEFT         CT-EXTREMITY, LOWER WITH LEFT   Final Result      1.  Extensive soft tissue induration and/or edema is identified throughout the subcutaneous fat and intramuscular fat planes of the left lower extremity. Findings could be due to cellulitis or edema.      2.  No soft tissue abscess is appreciated.      3.  No bone erosion is identified that would suggest osteomyelitis.      4.  Prominent dilated superficial veins are identified likely related to congestion inflammation or infection.      5.   Enlarged left inguinal lymph node is identified likely due to inflammation or infection.      EC-ECHOCARDIOGRAM COMPLETE W/O CONT    (Results Pending)   MR-LUMBAR SPINE-WITH    (Results Pending)      Assessment/Plan  * Wound infection, left inner thigh- (present on admission)  Assessment & Plan  -CT scan negative for abscess or osteomyelitis.   -ID consulted today.  -Wound culture in process. Has MSSA bacteremia.   -Continue abx per ID.   -Pending wound care consult & recs. May need surgical I&D.     Bacteremia due to methicillin resistant Staphylococcus aureus- (present on admission)  Assessment & Plan  -ID consulted today.  -TTE ordered. If negative, will order BAILEY.   -MRI lumbar spine with contrast ordered due to L-spine pain & point tenderness.   -Abx per ID. Currently on IV ancef.     LLE Cellulitis- (present on admission)  Assessment & Plan  -CT scan negative for abscess or osteomyelitis.   -ID consulted today.  -Monitor clinical progression.     Heroin abuse (HCC)- (present on admission)  Assessment & Plan  -Continue to reiterate the importance of cessation.   -Methadone ordered for withdrawal. Got inadequate relief with that so far. Ordered a 1 time dose of diazepam for additional relief.      VTE prophylaxis: Lovenox      Electronically signed by:  Tona Caba, MSN, RN, APRN, ACNPC-AG, CCRN  Nurse Practitioner, [facility] Services  Work # [phone]  Cell # [phone] (call, text, or tiger text)    12/16/2019    2:11 PM

## 2019-12-16 NOTE — ASSESSMENT & PLAN NOTE
-ID following.  -TTE negative.  Cardiology doesn't feel BAILEY is warranted.   -MRI lumbar spine with contrast ordered due to L-spine pain & point tenderness, was negative for abscess, diskitis, osteo.   -Continuing IV ancef per ID recs, end date 1/14/2020.  -Repeat blood cultures drawn 12/17/2019 finalized negative. PICC line placed 12/20/19.

## 2019-12-17 ENCOUNTER — APPOINTMENT (OUTPATIENT)
Dept: CARDIOLOGY | Facility: MEDICAL CENTER | Age: 54
DRG: 580 | End: 2019-12-17
Attending: NURSE PRACTITIONER
Payer: MEDICAID

## 2019-12-17 LAB
ANION GAP SERPL CALC-SCNC: 9 MMOL/L (ref 0–11.9)
BACTERIA WND AEROBE CULT: ABNORMAL
BASOPHILS # BLD AUTO: 0.9 % (ref 0–1.8)
BASOPHILS # BLD: 0.06 K/UL (ref 0–0.12)
BUN SERPL-MCNC: 14 MG/DL (ref 8–22)
CALCIUM SERPL-MCNC: 9.1 MG/DL (ref 8.5–10.5)
CHLORIDE SERPL-SCNC: 104 MMOL/L (ref 96–112)
CO2 SERPL-SCNC: 27 MMOL/L (ref 20–33)
CREAT SERPL-MCNC: 0.72 MG/DL (ref 0.5–1.4)
EOSINOPHIL # BLD AUTO: 0.21 K/UL (ref 0–0.51)
EOSINOPHIL NFR BLD: 3.3 % (ref 0–6.9)
ERYTHROCYTE [DISTWIDTH] IN BLOOD BY AUTOMATED COUNT: 43.5 FL (ref 35.9–50)
GLUCOSE SERPL-MCNC: 109 MG/DL (ref 65–99)
GRAM STN SPEC: ABNORMAL
HCT VFR BLD AUTO: 45.5 % (ref 42–52)
HGB BLD-MCNC: 15.1 G/DL (ref 14–18)
IMM GRANULOCYTES # BLD AUTO: 0.15 K/UL (ref 0–0.11)
IMM GRANULOCYTES NFR BLD AUTO: 2.3 % (ref 0–0.9)
LV EJECT FRACT  99904: 55
LV EJECT FRACT MOD 4C 99902: 48.74
LYMPHOCYTES # BLD AUTO: 1.2 K/UL (ref 1–4.8)
LYMPHOCYTES NFR BLD: 18.7 % (ref 22–41)
MAGNESIUM SERPL-MCNC: 2.6 MG/DL (ref 1.5–2.5)
MCH RBC QN AUTO: 29.6 PG (ref 27–33)
MCHC RBC AUTO-ENTMCNC: 33.2 G/DL (ref 33.7–35.3)
MCV RBC AUTO: 89.2 FL (ref 81.4–97.8)
MONOCYTES # BLD AUTO: 0.67 K/UL (ref 0–0.85)
MONOCYTES NFR BLD AUTO: 10.4 % (ref 0–13.4)
NEUTROPHILS # BLD AUTO: 4.14 K/UL (ref 1.82–7.42)
NEUTROPHILS NFR BLD: 64.4 % (ref 44–72)
NRBC # BLD AUTO: 0 K/UL
NRBC BLD-RTO: 0 /100 WBC
PLATELET # BLD AUTO: 317 K/UL (ref 164–446)
PMV BLD AUTO: 10 FL (ref 9–12.9)
POTASSIUM SERPL-SCNC: 4.8 MMOL/L (ref 3.6–5.5)
RBC # BLD AUTO: 5.1 M/UL (ref 4.7–6.1)
SIGNIFICANT IND 70042: ABNORMAL
SITE SITE: ABNORMAL
SODIUM SERPL-SCNC: 140 MMOL/L (ref 135–145)
SOURCE SOURCE: ABNORMAL
WBC # BLD AUTO: 6.4 K/UL (ref 4.8–10.8)

## 2019-12-17 PROCEDURE — 99232 SBSQ HOSP IP/OBS MODERATE 35: CPT | Performed by: INTERNAL MEDICINE

## 2019-12-17 PROCEDURE — 99233 SBSQ HOSP IP/OBS HIGH 50: CPT | Performed by: INTERNAL MEDICINE

## 2019-12-17 PROCEDURE — 93306 TTE W/DOPPLER COMPLETE: CPT | Mod: 26 | Performed by: INTERNAL MEDICINE

## 2019-12-17 PROCEDURE — 36415 COLL VENOUS BLD VENIPUNCTURE: CPT

## 2019-12-17 PROCEDURE — A9270 NON-COVERED ITEM OR SERVICE: HCPCS | Performed by: HOSPITALIST

## 2019-12-17 PROCEDURE — 700102 HCHG RX REV CODE 250 W/ 637 OVERRIDE(OP): Performed by: HOSPITALIST

## 2019-12-17 PROCEDURE — A9270 NON-COVERED ITEM OR SERVICE: HCPCS | Performed by: NURSE PRACTITIONER

## 2019-12-17 PROCEDURE — 700111 HCHG RX REV CODE 636 W/ 250 OVERRIDE (IP): Performed by: HOSPITALIST

## 2019-12-17 PROCEDURE — 700102 HCHG RX REV CODE 250 W/ 637 OVERRIDE(OP): Performed by: NURSE PRACTITIONER

## 2019-12-17 PROCEDURE — 87040 BLOOD CULTURE FOR BACTERIA: CPT | Mod: 91

## 2019-12-17 PROCEDURE — 80048 BASIC METABOLIC PNL TOTAL CA: CPT

## 2019-12-17 PROCEDURE — 85025 COMPLETE CBC W/AUTO DIFF WBC: CPT

## 2019-12-17 PROCEDURE — 770006 HCHG ROOM/CARE - MED/SURG/GYN SEMI*

## 2019-12-17 PROCEDURE — 83735 ASSAY OF MAGNESIUM: CPT

## 2019-12-17 PROCEDURE — 93306 TTE W/DOPPLER COMPLETE: CPT

## 2019-12-17 RX ORDER — SODIUM HYPOCHLORITE 1.25 MG/ML
SOLUTION TOPICAL 2 TIMES DAILY
Status: DISCONTINUED | OUTPATIENT
Start: 2019-12-17 | End: 2019-12-18

## 2019-12-17 RX ADMIN — ENOXAPARIN SODIUM 40 MG: 100 INJECTION SUBCUTANEOUS at 06:36

## 2019-12-17 RX ADMIN — OXYCODONE HYDROCHLORIDE 10 MG: 10 TABLET ORAL at 03:00

## 2019-12-17 RX ADMIN — OXYCODONE HYDROCHLORIDE 10 MG: 10 TABLET ORAL at 10:35

## 2019-12-17 RX ADMIN — OXYCODONE HYDROCHLORIDE 10 MG: 10 TABLET ORAL at 13:51

## 2019-12-17 RX ADMIN — CEFAZOLIN SODIUM 2 G: 2 INJECTION, SOLUTION INTRAVENOUS at 13:51

## 2019-12-17 RX ADMIN — CEFAZOLIN SODIUM 2 G: 2 INJECTION, SOLUTION INTRAVENOUS at 06:41

## 2019-12-17 RX ADMIN — OXYCODONE HYDROCHLORIDE 5 MG: 5 TABLET ORAL at 21:00

## 2019-12-17 RX ADMIN — MORPHINE SULFATE 4 MG: 4 INJECTION INTRAVENOUS at 14:58

## 2019-12-17 RX ADMIN — SENNOSIDES AND DOCUSATE SODIUM 2 TABLET: 8.6; 5 TABLET ORAL at 06:36

## 2019-12-17 RX ADMIN — METHADONE HYDROCHLORIDE 10 MG: 10 TABLET ORAL at 06:36

## 2019-12-17 RX ADMIN — CEFAZOLIN SODIUM 2 G: 2 INJECTION, SOLUTION INTRAVENOUS at 21:32

## 2019-12-17 ASSESSMENT — ENCOUNTER SYMPTOMS
CONSTIPATION: 0
SPEECH CHANGE: 0
COUGH: 0
DEPRESSION: 0
DIARRHEA: 0
NAUSEA: 0
CHILLS: 0
SEIZURES: 0
WEAKNESS: 0
MYALGIAS: 0
BACK PAIN: 0
SHORTNESS OF BREATH: 0
WHEEZING: 0
HEADACHES: 0
FOCAL WEAKNESS: 0
NERVOUS/ANXIOUS: 0
INSOMNIA: 0
FEVER: 0
SENSORY CHANGE: 0
VOMITING: 0
PALPITATIONS: 0
DIAPHORESIS: 0
ABDOMINAL PAIN: 0
DIZZINESS: 0
MYALGIAS: 1

## 2019-12-17 ASSESSMENT — LIFESTYLE VARIABLES: SUBSTANCE_ABUSE: 1

## 2019-12-17 NOTE — DISCHARGE PLANNING
Anticipated Discharge Disposition: TBD    Action: Pt discussed in IDT rounds. Pending IV abx recommendations. Discharge dispo pending outpatient needs.     Barriers to Discharge: None    Plan: Continue to assess for discharge needs.

## 2019-12-17 NOTE — PROGRESS NOTES
Infectious Disease Progress Note    Author: Stephanie Perez M.D. Date & Time of service: 2019  12:34 PM    Chief Complaint:  MSSA bacteremia    Interval history:     Review of Systems:  Review of Systems   Constitutional: Positive for malaise/fatigue. Negative for chills and fever.   Respiratory: Negative for cough and shortness of breath.    Gastrointestinal: Negative for abdominal pain, constipation, diarrhea, nausea and vomiting.   Musculoskeletal: Positive for joint pain and myalgias.       Hemodynamics:  Temp (24hrs), Av.6 °C (97.8 °F), Min:36.2 °C (97.1 °F), Max:36.8 °C (98.2 °F)  Temperature: 36.2 °C (97.1 °F)  Pulse  Av.1  Min: 57  Max: 88   Blood Pressure: 124/81       Physical Exam:  Physical Exam  Constitutional:       Appearance: Normal appearance.   HENT:      Head: Normocephalic and atraumatic.   Cardiovascular:      Rate and Rhythm: Regular rhythm. Tachycardia present.   Pulmonary:      Effort: Pulmonary effort is normal.      Breath sounds: Normal breath sounds.   Abdominal:      General: Abdomen is flat.      Palpations: Abdomen is soft.   Musculoskeletal: Normal range of motion.      Comments: Left leg with chronic scabbed over lower extremity lesion.  Nodule with surrounding erythema, fluctuance and tenderness.  Thigh abscess with ongoing purulent drainage,surrounding erythema and tenderness.   Skin:     General: Skin is warm and dry.   Neurological:      General: No focal deficit present.      Mental Status: He is alert and oriented to person, place, and time.   Psychiatric:         Mood and Affect: Mood normal.         Behavior: Behavior normal.         Meds:    Current Facility-Administered Medications:   •  methadone  •  ceFAZolin  •  diazePAM  •  senna-docusate **AND** polyethylene glycol/lytes **AND** magnesium hydroxide **AND** bisacodyl  •  enoxaparin  •  acetaminophen  •  Notify provider if pain remains uncontrolled **AND** Use the numeric rating scale (NRS-11) on  regular floors and Critical-Care Pain Observation Tool (CPOT) on ICUs/Trauma to assess pain **AND** Pulse Ox (Oximetry) **AND** Pharmacy Consult Request **AND** If patient difficult to arouse and/or has respiratory depression, stop any opiates that are currently infusing and call a Rapid Response. **AND** oxyCODONE immediate-release **AND** oxyCODONE immediate-release **AND** morphine injection  •  ondansetron  •  ondansetron  •  promethazine  •  promethazine  •  prochlorperazine    Labs:  Recent Labs     12/15/19  1130 12/16/19  0258 12/17/19  0327   WBC 10.5 7.7 6.4   RBC 4.29* 4.58* 5.10   HEMOGLOBIN 12.7* 13.6* 15.1   HEMATOCRIT 38.6* 40.8* 45.5   MCV 90.0 89.1 89.2   MCH 29.6 29.7 29.6   RDW 43.8 44.0 43.5   PLATELETCT 275 257 317   MPV 10.0 10.1 10.0   NEUTSPOLYS 77.30* 67.70 64.40   LYMPHOCYTES 11.90* 17.90* 18.70*   MONOCYTES 7.30 9.90 10.40   EOSINOPHILS 1.70 2.20 3.30   BASOPHILS 0.60 0.60 0.90     Recent Labs     12/15/19  1130 12/16/19  0258 12/17/19  0327   SODIUM 134* 140 140   POTASSIUM 4.2 4.0 4.8   CHLORIDE 98 106 104   CO2 30 25 27   GLUCOSE 122* 102* 109*   BUN 16 11 14     Recent Labs     12/15/19  1130 12/16/19  0258 12/17/19  0327   ALBUMIN 3.4  --   --    TBILIRUBIN 0.9  --   --    ALKPHOSPHAT 58  --   --    TOTPROTEIN 7.8  --   --    ALTSGPT 24  --   --    ASTSGOT 27  --   --    CREATININE 0.76 0.63 0.72       Imaging:  Ct-extremity, Lower With Left    Result Date: 12/15/2019  12/15/2019 1:25 PM HISTORY/REASON FOR EXAM:  Left leg swelling and redness. TECHNIQUE/EXAM DESCRIPTION AND NUMBER OF VIEWS:  CT scan of the LEFT lower extremity with contrast, with reconstructions. Thin helical 3 mm sections were obtained from the distal femur through the proximal tibia/fibula. Sagittal and coronal multiplanar reconstructions were generated from the axial images. A total of 80 mL of Omnipaque 350 nonionic contrast was administered IV without complication. Up to date radiation dose reduction adjustments  have been utilized to meet ALARA standards for radiation dose reduction. COMPARISON: None. FINDINGS: There is abnormally increased attenuation throughout the subcutaneous fat and intramuscular fat planes in the left lower extremity. This process is most prominent in the lower leg area below the level of the knee and present to a lesser degree in the thigh region. Prominent dilated veins are identified which are most prominent superficially and medially. No localized fluid collection that would suggest abscess is identified. No bone erosion or bone destruction is identified. Enlarged lymph node in the left inguinal area is identified with short axis diameter 1.8 cm.     1.  Extensive soft tissue induration and/or edema is identified throughout the subcutaneous fat and intramuscular fat planes of the left lower extremity. Findings could be due to cellulitis or edema. 2.  No soft tissue abscess is appreciated. 3.  No bone erosion is identified that would suggest osteomyelitis. 4.  Prominent dilated superficial veins are identified likely related to congestion inflammation or infection. 5.  Enlarged left inguinal lymph node is identified likely due to inflammation or infection.    Mr-lumbar Spine-with & W/o    Result Date: 12/17/2019 12/16/2019 9:59 PM HISTORY/REASON FOR EXAM:  MSSA infection with L-spine pain and point tenderness TECHNIQUE/EXAM DESCRIPTION: MRI of the lumbar spine without and with contrast. The study was performed on a [employer] Signa 1.5 Carlotta MRI scanner. T1 sagittal, T2 fast spin-echo sagittal, and T2 axial images were obtained of the lumbar spine. T1 postcontrast fat-suppressed sagittal images were obtained. 20 mL ProHance contrast was administered intravenously. COMPARISON:  None. FINDINGS: 5 lumbar type vertebral bodies are counted. Conus medullaris terminates at T12-L1 and is normal in signal. Straightening of the lumbar spine. No compression fracture. No suspicious abnormal disc or bone marrow  edema or enhancement is seen to suggest discitis osteomyelitis. No epidural fluid collection is seen. There is congenital narrowing of the lumbar spinal canal. T11-L2: Normal. L2-L3: Diffuse disc bulge. Borderline central canal stenosis. Mild right foraminal narrowing. L3-L4: Mild diffuse disc bulge. No significant spinal or foraminal stenosis. L4-L5: Diffuse disc bulge, posterior annular fissure and disc protrusion. Mild facet degeneration. Mild spinal stenosis. Right lateral recess stenosis which may involve the right L5 nerve. Correlate for right L5 radiculopathy. Mild bilateral foraminal stenosis. L5-S1: Disc narrowing, diffuse disc osteophyte and posterior annular fissure. Borderline central canal stenosis. Lateral recess narrowing, left greater than right and at least moderate foraminal stenosis.     1.  No evidence of discitis/osteomyelitis or abnormal fluid collection. 2.  Congenital/developmental narrowing of the lumbar spinal canal. 3.  L2-S1 degenerative changes as detailed above.    Us-steve Single Level Bilat    Result Date: 2019   Vascular Laboratory  Conclusions  No evidence of arterial insufficiency.  RAI KENDRICK  Age:    53    Gender:     M  MRN:    [id number]  :    1965      BSA:  Exam Date:     2019 08:10  Room #:     Inpatient  Priority:     Routine  Ht (in):             Wt (lb):  Ordering Physician:        VALERIE AVALOS  Referring Physician:       VALERIE AVALOS  Sonographer:               Morales Suggs RVT, RDMS  Study Type:                Complete Bilateral  Technical Quality:         Adequate  Indications:     Atherosclerosis of native arteries of left leg with                   ulceration of unspecified site  CPT Codes:       85327  ICD Codes:       I70.249  History:         Nonhealing wound of left lower extremity  Limitations:     Unable to obtain right brachial pressure due to line in arm,                    Unable to obtain left popliteal  waveform due to wound                   dressing                 RIGHT  Waveform            Systolic BPs (mmHg)                                           Brachial  Triphasic                                Common Femoral  Triphasic                  135           Posterior Tibial  Triphasic                  137           Dorsalis Pedis                                           Peroneal                             1.18          STEVE                                           TBI                       LEFT  Waveform        Systolic BPs (mmHg)                             116           Brachial  Triphasic                                Common Femoral  Triphasic                  132           Posterior Tibial  Triphasic                  134           Dorsalis Pedis                                           Peroneal                             1.16          STEVE                                           TBI  Findings  Bilateral.  Doppler waveform of the common femoral artery is of high amplitude and  triphasic.  Doppler waveforms at the ankle are brisk and triphasic.  Ankle-brachial index is normal.  Additional testing was not performed in accordance with lower extremity  arterial evaluation protocol.  Charan Cruz MD  (Electronically Signed)  Final Date:      2019                   10:58    Us-extremity Artery Lower Unilat W/steve (combo) Left    Result Date: 2019   Vascular Laboratory  Conclusions  KENDRICK ATWOOD  Age:    53    Gender:     M  MRN:    [id number]  :    1965      BSA:  Exam Date:     2019 08:10  Room #:     Inpatient  Priority:     Routine  Ht (in):             Wt (lb):  Ordering Physician:        VALERIE AVALOS  Referring Physician:       [id number]  Sonographer:               Morales Suggs RVT,                             LAMIN  Study Type:                Complete Bilateral  Technical Quality:         Adequate  Indications:     Atherosclerosis of native arteries of  left leg with                   ulceration of unspecified site  CPT Codes:       42950  ICD Codes:       I70.249  History:         Nonhealing wound of left lower extremity  Limitations:     Unable to obtain right brachial pressure due to line in arm,                    Unable to obtain left popliteal waveform due to wound                   dressing                 RIGHT  Waveform            Systolic BPs (mmHg)                                           Brachial  Triphasic                                Common Femoral  Triphasic                  135           Posterior Tibial  Triphasic                  137           Dorsalis Pedis                                           Peroneal                             1.18          DIANE                                           TBI                       LEFT  Waveform        Systolic BPs (mmHg)                             116           Brachial  Triphasic                                Common Femoral  Triphasic                  132           Posterior Tibial  Triphasic                  134           Dorsalis Pedis                                           Peroneal                             1.16          DIANE                                           TBI  Findings  Bilateral.  Doppler waveform of the common femoral artery is of high amplitude and  triphasic.  Doppler waveforms at the ankle are brisk and triphasic.  Ankle-brachial index is normal.  Additional testing was not performed in accordance with lower extremity  arterial evaluation protocol.       Micro:  Results     Procedure Component Value Units Date/Time    Culture Wound W/ Gram Stain [[id number]]  (Abnormal)  (Susceptibility) Collected:  12/15/19 1115    Order Status:  Completed Specimen:  Wound from Left Leg Updated:  12/17/19 0859     Significant Indicator POS     Source WND     Site LEFT LEG     Culture Result -     Gram Stain Result Moderate WBCs.  Many Gram positive cocci.       Culture Result Beta Hemolytic  "Streptococcus group A  Heavy growth        Staphylococcus aureus  Moderate growth      Narrative:       CALL  Dsouza  MS5 tel. [phone],  CALLED  MS5 tel. [phone] 12/16/2019, 15:13, RB PERF. RESULTS CALLED  TO:Maryann [id number] Rn    Susceptibility     Staphylococcus aureus (2)     Antibiotic Interpretation Microscan Method Status    Clindamycin Sensitive <=0.5 mcg/mL ANTONIO Final    Daptomycin Sensitive <=0.5 mcg/mL ANTONIO Final    Erythromycin Sensitive <=0.5 mcg/mL ANTONIO Final    Moxifloxacin Sensitive 1 mcg/mL ANTONIO Final    Ampicillin/sulbactam Sensitive <=8/4 mcg/mL ANTONIO Final    Oxacillin Sensitive <=0.25 mcg/mL ANTONIO Final    Vancomycin Sensitive 1 mcg/mL ANTONIO Final    Penicillin Resistant 2 mcg/mL ANTONIO Final    Trimeth/Sulfa Sensitive <=0.5/9.5 mcg/mL ANTONIO Final    Tetracycline Sensitive <=4 mcg/mL ANTONIO Final                   BLOOD CULTURE [[id number]] Collected:  12/17/19 0327    Order Status:  Completed Specimen:  Blood from Peripheral Updated:  12/17/19 0354    Narrative:       Per Hospital Policy: Only change Specimen Src: to \"Line\" if  specified by physician order.    BLOOD CULTURE [[id number]] Collected:  12/17/19 0332    Order Status:  Completed Specimen:  Blood from Peripheral Updated:  12/17/19 0354    Narrative:       Per Hospital Policy: Only change Specimen Src: to \"Line\" if  specified by physician order.    BLOOD CULTURE [[id number]]  (Abnormal) Collected:  12/15/19 1130    Order Status:  Completed Specimen:  Blood from Peripheral Updated:  12/16/19 1024     Significant Indicator POS     Source BLD     Site PERIPHERAL     Culture Result Growth detected by Bactec instrument. 12/16/2019  10:18  Gram Stain: Gram positive cocci: Possible Staphylococcus sp.  Staphylococcus aureus (methicillin sensitive)  detected by PCR.  Susceptibility to follow.      Narrative:       CALL  Dsouza  MS5 tel. [phone],  CALLED  MS5 tel. [phone] 12/16/2019, 10:24, called [phone] Dionne  2 of 2 blood culture x2  Sites order. Per Hospital " "Policy:  Only change Specimen Src: to \"Line\" if specified by physician  order.  No site indicated    BLOOD CULTURE [[id number]] Collected:  12/15/19 1151    Order Status:  Completed Specimen:  Blood from Peripheral Updated:  12/16/19 0946     Significant Indicator NEG     Source BLD     Site PERIPHERAL     Culture Result No Growth  Note: Blood cultures are incubated for 5 days and  are monitored continuously.Positive blood cultures  are called to the RN and reported as soon as  they are identified.      Narrative:       1 of 2 for Blood Culture x 2 sites order. Per Hospital  Policy: Only change Specimen Src: to \"Line\" if specified by  physician order.  Left Forearm/Arm    GRAM STAIN [[id number]] Collected:  12/15/19 1115    Order Status:  Completed Specimen:  Wound Updated:  12/15/19 2120     Significant Indicator .     Source WND     Site LEFT LEG     Gram Stain Result Moderate WBCs.  Many Gram positive cocci.            Assessment:  Active Hospital Problems    Diagnosis   • *Wound infection, left inner thigh [T14.8XXA, L08.9]   • Bacteremia due to methicillin resistant Staphylococcus aureus [R78.81]   • LLE Cellulitis [L03.90]   • Heroin abuse (HCC) [F11.10]     Interval 24 hour assessment:      AF, O2 RA  Labs reviewed  Imaging personally reviewed both images and report.   Studies reviewed  Micro reviewed    Pt continued on cefazolin.  New left leg nodular lesion concerning for abscess.  Ongoing purulent drainage from left thigh abscess.  Patient had TTE today and awaiting results.       ASSESSMENT/PLAN:      Jaime Gunderson is a 53 y.o.  admitted 12/15/2019. Pt has a past medical history of injury to left lower leg with chronic nonhealing wound as well as active IV drug use with heroin.  He has a history of injecting into veins of right arm as well as left leg.  He states he had a recent abscess on his right arm which he cut open himself and drained and it is healed.  He then had a similar appearance on his " left thigh with edema, erythema and drainage at injection site.  He then used a razor blade and cut open the wound and attempt to drain it.  However, the left thigh had increasing erythema pain and drainage.  His symptoms been ongoing for approximately 7 days and due to worsening wound on his left leg he presented to the ER.     Hospital Course:   The patient is been afebrile and no significant leukocytosis.  Blood cultures on 12/15 1/2+ for MSSA.  See below for details.  He was admitted and started on vancomycin and Unasyn.  Now transition to cefazolin.         MSSA bacteremia, IV drug use  - Blood cultures on 12/15 1/2+ for MSSA. Per PCR  -Blood cultures on 12/16 2/2-pending    Left thigh abscess  -2/2 IV drug use  -CT left leg on 12/15 with edema, no osteomyelitis, no abscess  -Wound cultures on 12/15 with group A strep and MSSA     IV drug use, active, heroin  -HIV screen negative on 12/16  -Acute hepatitis panel negative on 12/15 with exception of positive hepatitis C antibody    Injury to left lower leg with chronic nonhealing wounds which have worsened recently    Injury to lumbar spine, chronic back pain   -MRI lumbar spine with contrast on 12/16 with no evidence of discitis/osteomyelitis or abnormal fluid collection.     Known hepatitis C antibody positive     --- Continue cefazolin  --- Recommend surgery consultation to see if I&D is needed for the left thigh abscess as well as for possible I&D of new left leg nodule concerning for abscess  --- F/up repeat blood cultures, ordered  --- Agree with TTE, ordered  --- Wound care for left leg  --- F/up Hepatitis C PCR quant          Plan of care discussed with FRANCISCO Caba. Will continue to follow

## 2019-12-17 NOTE — CARE PLAN
Problem: Infection  Goal: Will remain free from infection  Outcome: PROGRESSING SLOWER THAN EXPECTED  Note:   Pt on IV abx.      Problem: Communication  Goal: The ability to communicate needs accurately and effectively will improve  Intervention: Educate patient and significant other/support system about the plan of care, procedures, treatments, medications and allow for questions  Note:   Pt awaiting for wound care consult. Questions answered at bedside. Pt verbalizing understanding maintaining wounds clean/D/I.

## 2019-12-17 NOTE — PROGRESS NOTES
Assumed care of pt at 0745. Report received and bedside rounding completed with night RN. Pt is calm no SOB, or in any acute distress noted.    Left thigh wound noted. Purulent drainage, pt has a foam in place. Right shin wound CHARI. Pt not c/o any pain at this time. Encouraging pt to shower and cleanse wound with water.     fall risk. edu provided on risk level.   Fall precautions in place- Treaded non slip socks. Bed locked. Communication board updated with POC. Call light and pt belongings within reach - pt makes needs known - hourly rounding in place. See flowsheets for further assessment.

## 2019-12-17 NOTE — PROGRESS NOTES
Hospital Medicine Daily Progress Note    Date of Service  12/17/2019    Chief Complaint  Wound check    Hospital Course   Mr. Gunderson is a 54 y/o male who presented to the ED on 12/15/19 with a possible abscess to his right forearm in addition to 2 large open wounds on the inside of his left thigh with a large amount of purulent drainage where he has been injecting heroin. He was told he would need medical clearance prior to undergoing rehab through [facility]. A CT scan was done and showed extensive soft tissue induration and/or edema throughout the subcutaneous fat and intramuscular fat planes of the left lower extremity which could be due to cellulitis or edema. There was no appreciable abscess or evidence of osteomyelitis. Wound culture was positive for group A strep and MSSA, and his blood cultures were also positive for MSSA in 1 out of 2 bottles. An echocardiogram, wound consult, and lumbar spine MRI were subsequently ordered. If the TTE is negative, we will plan on doing a BAILEY. A US of the LE was also done and had no evidence of arterial insufficiency.         Interval Problem Update  Withdrawal symptoms are improved today. Has no complaints. Back pain & point tenderness have resolved.     CBC unremarkable today. BMP also normal outside of elevated mag level of 2.6. Hep C Ab reactive. HIV non-reactive.     Afebrile overnight, HR 50s-60s, SBP 100s-120s, O2 sats WNL on room air.     TTE pending. If negative will do BAILEY.    MRI L-spine negative for abscess, diskitis, or osteo.     Wound consult pending, may need surgical I&D.      Consultants/Specialty  Infectious disease    Code Status  Full code    Disposition  Clinical for now.     Review of Systems  Review of Systems   Constitutional: Negative for chills, diaphoresis, fever and malaise/fatigue.   Respiratory: Negative for cough, shortness of breath and wheezing.    Cardiovascular: Negative for chest pain, palpitations and leg swelling.    Gastrointestinal: Negative for abdominal pain, constipation, diarrhea, nausea and vomiting.   Genitourinary: Negative for dysuria, frequency and urgency.   Musculoskeletal: Negative for back pain, joint pain and myalgias.   Skin:        + multiple wounds throughout his body (left anterior shin, right forearm, left inner thigh); purulent drainage noted from inner thigh wounds   Neurological: Negative for dizziness, sensory change, speech change, focal weakness, seizures, weakness and headaches.   Psychiatric/Behavioral: Positive for substance abuse (IV heroin). Negative for depression. The patient is not nervous/anxious and does not have insomnia.    All other systems reviewed and are negative.     Physical Exam  Temp:  [36.2 °C (97.1 °F)-36.8 °C (98.2 °F)] 36.2 °C (97.1 °F)  Pulse:  [57-71] 63  Resp:  [16-18] 16  BP: (104-124)/(59-81) 124/81  SpO2:  [95 %-98 %] 95 %    Physical Exam  Vitals signs and nursing note reviewed.   Constitutional:       General: He is awake. He is not in acute distress.     Appearance: Normal appearance. He is well-developed and overweight. He is not ill-appearing.   HENT:      Head: Normocephalic and atraumatic.      Mouth/Throat:      Lips: Pink.      Mouth: Mucous membranes are moist.   Eyes:      Conjunctiva/sclera: Conjunctivae normal.      Pupils: Pupils are equal, round, and reactive to light.   Neck:      Musculoskeletal: Normal range of motion and neck supple.      Vascular: No JVD.   Cardiovascular:      Rate and Rhythm: Normal rate and regular rhythm.      Pulses: Normal pulses.      Heart sounds: Normal heart sounds.   Pulmonary:      Effort: Pulmonary effort is normal.      Breath sounds: Normal breath sounds.   Abdominal:      General: Bowel sounds are normal. There is no distension or abdominal bruit.      Palpations: Abdomen is soft.      Tenderness: There is no tenderness.   Musculoskeletal:      Lumbar back: He exhibits no tenderness, no bony tenderness and no pain.       Right lower leg: No edema.      Left lower leg: No edema.   Skin:     General: Skin is warm and dry.          Neurological:      General: No focal deficit present.      Mental Status: He is alert and oriented to person, place, and time.      GCS: GCS eye subscore is 4. GCS verbal subscore is 5. GCS motor subscore is 6.      Cranial Nerves: Cranial nerves are intact.      Sensory: Sensation is intact.      Motor: Motor function is intact.      Coordination: Coordination is intact.      Gait: Gait is intact.   Psychiatric:         Attention and Perception: Attention and perception normal.         Mood and Affect: Mood and affect normal.         Speech: Speech normal.         Behavior: Behavior normal. Behavior is cooperative.         Thought Content: Thought content normal.         Cognition and Memory: Cognition and memory normal.         Judgment: Judgment normal.     Fluids    Intake/Output Summary (Last 24 hours) at 12/17/2019 1240  Last data filed at 12/17/2019 0936  Gross per 24 hour   Intake 460 ml   Output 1175 ml   Net -715 ml     Laboratory  Recent Labs     12/15/19  1130 12/16/19  0258 12/17/19  0327   WBC 10.5 7.7 6.4   RBC 4.29* 4.58* 5.10   HEMOGLOBIN 12.7* 13.6* 15.1   HEMATOCRIT 38.6* 40.8* 45.5   MCV 90.0 89.1 89.2   MCH 29.6 29.7 29.6   MCHC 32.9* 33.3* 33.2*   RDW 43.8 44.0 43.5   PLATELETCT 275 257 317   MPV 10.0 10.1 10.0     Recent Labs     12/15/19  1130 12/16/19  0258 12/17/19  0327   SODIUM 134* 140 140   POTASSIUM 4.2 4.0 4.8   CHLORIDE 98 106 104   CO2 30 25 27   GLUCOSE 122* 102* 109*   BUN 16 11 14   CREATININE 0.76 0.63 0.72   CALCIUM 9.1 8.9 9.1     Imaging  MR-LUMBAR SPINE-WITH & W/O   Final Result      1.  No evidence of discitis/osteomyelitis or abnormal fluid collection.   2.  Congenital/developmental narrowing of the lumbar spinal canal.   3.  L2-S1 degenerative changes as detailed above.      US-DIANE SINGLE LEVEL BILAT   Final Result      US-EXTREMITY ARTERY LOWER UNILAT W/DIANE  (COMBO) LEFT         CT-EXTREMITY, LOWER WITH LEFT   Final Result      1.  Extensive soft tissue induration and/or edema is identified throughout the subcutaneous fat and intramuscular fat planes of the left lower extremity. Findings could be due to cellulitis or edema.      2.  No soft tissue abscess is appreciated.      3.  No bone erosion is identified that would suggest osteomyelitis.      4.  Prominent dilated superficial veins are identified likely related to congestion inflammation or infection.      5.  Enlarged left inguinal lymph node is identified likely due to inflammation or infection.      EC-ECHOCARDIOGRAM COMPLETE W/O CONT    (Results Pending)      Assessment/Plan  * Wound infection, left inner thigh- (present on admission)  Assessment & Plan  -CT scan negative for abscess or osteomyelitis.   -ID following.   -Wound culture + MSSA & group A strep. BC also + for MSSA.   -Continue abx per ID.   -Pending wound care consult & recs. May need surgical I&D.     Bacteremia due to methicillin resistant Staphylococcus aureus- (present on admission)  Assessment & Plan  -ID following.  -TTE ordered. If negative, will order BAILEY.   -MRI lumbar spine with contrast ordered due to L-spine pain & point tenderness, was negative for abscess, diskitis, osteo.   -Abx per ID. Currently on IV ancef.     LLE Cellulitis- (present on admission)  Assessment & Plan  -CT scan negative for abscess or osteomyelitis.   -ID following. Continue abx per their recs.   -Monitor clinical progression.     Heroin abuse (HCC)- (present on admission)  Assessment & Plan  -Continue to reiterate the importance of cessation.   -Continue methadone for withdrawal.      VTE prophylaxis: Lovenox      Electronically signed by:  Tona Caba, MSN, RN, APRN, ACNPC-AG, CCRN  Nurse Practitioner, [facility]  Work # [phone]  Cell # [phone] (call, text, or tiger text)    12/17/2019    12:40 PM

## 2019-12-18 ENCOUNTER — ANESTHESIA EVENT (OUTPATIENT)
Dept: SURGERY | Facility: MEDICAL CENTER | Age: 54
DRG: 580 | End: 2019-12-18
Payer: MEDICAID

## 2019-12-18 ENCOUNTER — ANESTHESIA (OUTPATIENT)
Dept: SURGERY | Facility: MEDICAL CENTER | Age: 54
DRG: 580 | End: 2019-12-18
Payer: MEDICAID

## 2019-12-18 LAB
EKG IMPRESSION: NORMAL
GRAM STN SPEC: NORMAL
HCV RNA SERPL NAA+PROBE-ACNC: ABNORMAL K[IU]/ML
HCV RNA SERPL NAA+PROBE-LOG IU: 6.79 LOG IU/ML
HCV RNA SERPL QL NAA+PROBE: DETECTED
SIGNIFICANT IND 70042: NORMAL
SITE SITE: NORMAL
SOURCE SOURCE: NORMAL

## 2019-12-18 PROCEDURE — 700105 HCHG RX REV CODE 258: Performed by: ANESTHESIOLOGY

## 2019-12-18 PROCEDURE — 700111 HCHG RX REV CODE 636 W/ 250 OVERRIDE (IP): Performed by: HOSPITALIST

## 2019-12-18 PROCEDURE — 160035 HCHG PACU - 1ST 60 MINS PHASE I: Performed by: ORTHOPAEDIC SURGERY

## 2019-12-18 PROCEDURE — A9270 NON-COVERED ITEM OR SERVICE: HCPCS | Performed by: HOSPITALIST

## 2019-12-18 PROCEDURE — 700102 HCHG RX REV CODE 250 W/ 637 OVERRIDE(OP): Performed by: NURSE PRACTITIONER

## 2019-12-18 PROCEDURE — 160009 HCHG ANES TIME/MIN: Performed by: ORTHOPAEDIC SURGERY

## 2019-12-18 PROCEDURE — 93005 ELECTROCARDIOGRAM TRACING: CPT | Performed by: INTERNAL MEDICINE

## 2019-12-18 PROCEDURE — 160002 HCHG RECOVERY MINUTES (STAT): Performed by: ORTHOPAEDIC SURGERY

## 2019-12-18 PROCEDURE — 700101 HCHG RX REV CODE 250

## 2019-12-18 PROCEDURE — 700102 HCHG RX REV CODE 250 W/ 637 OVERRIDE(OP): Performed by: ANESTHESIOLOGY

## 2019-12-18 PROCEDURE — 160048 HCHG OR STATISTICAL LEVEL 1-5: Performed by: ORTHOPAEDIC SURGERY

## 2019-12-18 PROCEDURE — 87205 SMEAR GRAM STAIN: CPT

## 2019-12-18 PROCEDURE — 770006 HCHG ROOM/CARE - MED/SURG/GYN SEMI*

## 2019-12-18 PROCEDURE — 87070 CULTURE OTHR SPECIMN AEROBIC: CPT

## 2019-12-18 PROCEDURE — 500891 HCHG PACK, ORTHO MAJOR: Performed by: ORTHOPAEDIC SURGERY

## 2019-12-18 PROCEDURE — 700111 HCHG RX REV CODE 636 W/ 250 OVERRIDE (IP): Performed by: ANESTHESIOLOGY

## 2019-12-18 PROCEDURE — A9270 NON-COVERED ITEM OR SERVICE: HCPCS | Performed by: ANESTHESIOLOGY

## 2019-12-18 PROCEDURE — 700101 HCHG RX REV CODE 250: Performed by: ANESTHESIOLOGY

## 2019-12-18 PROCEDURE — 160038 HCHG SURGERY MINUTES - EA ADDL 1 MIN LEVEL 2: Performed by: ORTHOPAEDIC SURGERY

## 2019-12-18 PROCEDURE — 87075 CULTR BACTERIA EXCEPT BLOOD: CPT

## 2019-12-18 PROCEDURE — 99254 IP/OBS CNSLTJ NEW/EST MOD 60: CPT | Performed by: INTERNAL MEDICINE

## 2019-12-18 PROCEDURE — 501838 HCHG SUTURE GENERAL: Performed by: ORTHOPAEDIC SURGERY

## 2019-12-18 PROCEDURE — 700102 HCHG RX REV CODE 250 W/ 637 OVERRIDE(OP): Performed by: HOSPITALIST

## 2019-12-18 PROCEDURE — A9270 NON-COVERED ITEM OR SERVICE: HCPCS | Performed by: NURSE PRACTITIONER

## 2019-12-18 PROCEDURE — 0KBR0ZZ EXCISION OF LEFT UPPER LEG MUSCLE, OPEN APPROACH: ICD-10-PCS | Performed by: ORTHOPAEDIC SURGERY

## 2019-12-18 PROCEDURE — 93010 ELECTROCARDIOGRAM REPORT: CPT | Performed by: INTERNAL MEDICINE

## 2019-12-18 PROCEDURE — 99233 SBSQ HOSP IP/OBS HIGH 50: CPT | Performed by: INTERNAL MEDICINE

## 2019-12-18 PROCEDURE — 160027 HCHG SURGERY MINUTES - 1ST 30 MINS LEVEL 2: Performed by: ORTHOPAEDIC SURGERY

## 2019-12-18 RX ORDER — METOCLOPRAMIDE HYDROCHLORIDE 5 MG/ML
INJECTION INTRAMUSCULAR; INTRAVENOUS PRN
Status: DISCONTINUED | OUTPATIENT
Start: 2019-12-18 | End: 2019-12-18 | Stop reason: SURG

## 2019-12-18 RX ORDER — HALOPERIDOL 5 MG/ML
1 INJECTION INTRAMUSCULAR
Status: DISCONTINUED | OUTPATIENT
Start: 2019-12-18 | End: 2019-12-18 | Stop reason: HOSPADM

## 2019-12-18 RX ORDER — OXYCODONE HCL 5 MG/5 ML
5 SOLUTION, ORAL ORAL
Status: COMPLETED | OUTPATIENT
Start: 2019-12-18 | End: 2019-12-18

## 2019-12-18 RX ORDER — SODIUM CHLORIDE, SODIUM LACTATE, POTASSIUM CHLORIDE, CALCIUM CHLORIDE 600; 310; 30; 20 MG/100ML; MG/100ML; MG/100ML; MG/100ML
INJECTION, SOLUTION INTRAVENOUS
Status: DISCONTINUED | OUTPATIENT
Start: 2019-12-18 | End: 2019-12-18 | Stop reason: SURG

## 2019-12-18 RX ORDER — DEXAMETHASONE SODIUM PHOSPHATE 4 MG/ML
INJECTION, SOLUTION INTRA-ARTICULAR; INTRALESIONAL; INTRAMUSCULAR; INTRAVENOUS; SOFT TISSUE PRN
Status: DISCONTINUED | OUTPATIENT
Start: 2019-12-18 | End: 2019-12-18 | Stop reason: SURG

## 2019-12-18 RX ORDER — OXYCODONE HCL 5 MG/5 ML
10 SOLUTION, ORAL ORAL
Status: COMPLETED | OUTPATIENT
Start: 2019-12-18 | End: 2019-12-18

## 2019-12-18 RX ORDER — SODIUM CHLORIDE, SODIUM LACTATE, POTASSIUM CHLORIDE, CALCIUM CHLORIDE 600; 310; 30; 20 MG/100ML; MG/100ML; MG/100ML; MG/100ML
INJECTION, SOLUTION INTRAVENOUS CONTINUOUS
Status: DISCONTINUED | OUTPATIENT
Start: 2019-12-18 | End: 2019-12-18 | Stop reason: HOSPADM

## 2019-12-18 RX ORDER — HYDROMORPHONE HYDROCHLORIDE 1 MG/ML
0.1 INJECTION, SOLUTION INTRAMUSCULAR; INTRAVENOUS; SUBCUTANEOUS
Status: DISCONTINUED | OUTPATIENT
Start: 2019-12-18 | End: 2019-12-18 | Stop reason: HOSPADM

## 2019-12-18 RX ORDER — MIDAZOLAM HYDROCHLORIDE 1 MG/ML
INJECTION INTRAMUSCULAR; INTRAVENOUS PRN
Status: DISCONTINUED | OUTPATIENT
Start: 2019-12-18 | End: 2019-12-18 | Stop reason: SURG

## 2019-12-18 RX ORDER — HALOPERIDOL 5 MG/ML
INJECTION INTRAMUSCULAR
Status: DISPENSED
Start: 2019-12-18 | End: 2019-12-19

## 2019-12-18 RX ORDER — DIAZEPAM 5 MG/1
5 TABLET ORAL ONCE
Status: COMPLETED | OUTPATIENT
Start: 2019-12-18 | End: 2019-12-18

## 2019-12-18 RX ORDER — HYDROMORPHONE HYDROCHLORIDE 1 MG/ML
0.2 INJECTION, SOLUTION INTRAMUSCULAR; INTRAVENOUS; SUBCUTANEOUS
Status: DISCONTINUED | OUTPATIENT
Start: 2019-12-18 | End: 2019-12-18 | Stop reason: HOSPADM

## 2019-12-18 RX ORDER — HYDROMORPHONE HYDROCHLORIDE 1 MG/ML
0.4 INJECTION, SOLUTION INTRAMUSCULAR; INTRAVENOUS; SUBCUTANEOUS
Status: DISCONTINUED | OUTPATIENT
Start: 2019-12-18 | End: 2019-12-18 | Stop reason: HOSPADM

## 2019-12-18 RX ORDER — DIPHENHYDRAMINE HYDROCHLORIDE 50 MG/ML
12.5 INJECTION INTRAMUSCULAR; INTRAVENOUS
Status: DISCONTINUED | OUTPATIENT
Start: 2019-12-18 | End: 2019-12-18 | Stop reason: HOSPADM

## 2019-12-18 RX ORDER — LABETALOL HYDROCHLORIDE 5 MG/ML
5 INJECTION, SOLUTION INTRAVENOUS
Status: DISCONTINUED | OUTPATIENT
Start: 2019-12-18 | End: 2019-12-18 | Stop reason: HOSPADM

## 2019-12-18 RX ORDER — LIDOCAINE HYDROCHLORIDE 20 MG/ML
INJECTION, SOLUTION EPIDURAL; INFILTRATION; INTRACAUDAL; PERINEURAL PRN
Status: DISCONTINUED | OUTPATIENT
Start: 2019-12-18 | End: 2019-12-18 | Stop reason: SURG

## 2019-12-18 RX ORDER — ONDANSETRON 2 MG/ML
4 INJECTION INTRAMUSCULAR; INTRAVENOUS
Status: DISCONTINUED | OUTPATIENT
Start: 2019-12-18 | End: 2019-12-18 | Stop reason: HOSPADM

## 2019-12-18 RX ORDER — METHADONE HYDROCHLORIDE 10 MG/1
10 TABLET ORAL 2 TIMES DAILY
Status: DISCONTINUED | OUTPATIENT
Start: 2019-12-18 | End: 2019-12-23 | Stop reason: HOSPADM

## 2019-12-18 RX ADMIN — HYDROMORPHONE HYDROCHLORIDE 0.4 MG: 1 INJECTION, SOLUTION INTRAMUSCULAR; INTRAVENOUS; SUBCUTANEOUS at 12:39

## 2019-12-18 RX ADMIN — METHADONE HYDROCHLORIDE 10 MG: 10 TABLET ORAL at 06:14

## 2019-12-18 RX ADMIN — HYDROMORPHONE HYDROCHLORIDE 0.4 MG: 1 INJECTION, SOLUTION INTRAMUSCULAR; INTRAVENOUS; SUBCUTANEOUS at 12:52

## 2019-12-18 RX ADMIN — ENOXAPARIN SODIUM 40 MG: 100 INJECTION SUBCUTANEOUS at 06:14

## 2019-12-18 RX ADMIN — HYDROMORPHONE HYDROCHLORIDE 0.2 MG: 1 INJECTION, SOLUTION INTRAMUSCULAR; INTRAVENOUS; SUBCUTANEOUS at 13:02

## 2019-12-18 RX ADMIN — METHADONE HYDROCHLORIDE 10 MG: 10 TABLET ORAL at 17:25

## 2019-12-18 RX ADMIN — HALOPERIDOL LACTATE 1 MG: 5 INJECTION, SOLUTION INTRAMUSCULAR at 12:30

## 2019-12-18 RX ADMIN — CEFAZOLIN SODIUM 2 G: 2 INJECTION, SOLUTION INTRAVENOUS at 21:59

## 2019-12-18 RX ADMIN — LIDOCAINE HYDROCHLORIDE 80 MG: 20 INJECTION, SOLUTION EPIDURAL; INFILTRATION; INTRACAUDAL at 11:55

## 2019-12-18 RX ADMIN — HYDROMORPHONE HYDROCHLORIDE 0.4 MG: 1 INJECTION, SOLUTION INTRAMUSCULAR; INTRAVENOUS; SUBCUTANEOUS at 12:57

## 2019-12-18 RX ADMIN — DIAZEPAM 5 MG: 5 TABLET ORAL at 09:35

## 2019-12-18 RX ADMIN — PROPOFOL 180 MG: 10 INJECTION, EMULSION INTRAVENOUS at 11:55

## 2019-12-18 RX ADMIN — DEXAMETHASONE SODIUM PHOSPHATE 10 MG: 4 INJECTION, SOLUTION INTRA-ARTICULAR; INTRALESIONAL; INTRAMUSCULAR; INTRAVENOUS; SOFT TISSUE at 11:58

## 2019-12-18 RX ADMIN — FENTANYL CITRATE 100 MCG: 50 INJECTION, SOLUTION INTRAMUSCULAR; INTRAVENOUS at 11:55

## 2019-12-18 RX ADMIN — OXYCODONE HYDROCHLORIDE 5 MG: 5 TABLET ORAL at 06:15

## 2019-12-18 RX ADMIN — FENTANYL CITRATE 50 MCG: 0.05 INJECTION, SOLUTION INTRAMUSCULAR; INTRAVENOUS at 12:32

## 2019-12-18 RX ADMIN — HYDROMORPHONE HYDROCHLORIDE 0.4 MG: 1 INJECTION, SOLUTION INTRAMUSCULAR; INTRAVENOUS; SUBCUTANEOUS at 12:42

## 2019-12-18 RX ADMIN — HYDROMORPHONE HYDROCHLORIDE 0.2 MG: 1 INJECTION, SOLUTION INTRAMUSCULAR; INTRAVENOUS; SUBCUTANEOUS at 12:47

## 2019-12-18 RX ADMIN — SODIUM CHLORIDE, POTASSIUM CHLORIDE, SODIUM LACTATE AND CALCIUM CHLORIDE: 600; 310; 30; 20 INJECTION, SOLUTION INTRAVENOUS at 11:50

## 2019-12-18 RX ADMIN — MIDAZOLAM 2 MG: 1 INJECTION INTRAMUSCULAR; INTRAVENOUS at 11:50

## 2019-12-18 RX ADMIN — SENNOSIDES AND DOCUSATE SODIUM 2 TABLET: 8.6; 5 TABLET ORAL at 06:14

## 2019-12-18 RX ADMIN — OXYCODONE HYDROCHLORIDE 5 MG: 5 TABLET ORAL at 03:14

## 2019-12-18 RX ADMIN — FENTANYL CITRATE 50 MCG: 0.05 INJECTION, SOLUTION INTRAMUSCULAR; INTRAVENOUS at 12:34

## 2019-12-18 RX ADMIN — METOCLOPRAMIDE 10 MG: 5 INJECTION, SOLUTION INTRAMUSCULAR; INTRAVENOUS at 11:58

## 2019-12-18 RX ADMIN — CEFAZOLIN SODIUM 2 G: 2 INJECTION, SOLUTION INTRAVENOUS at 06:14

## 2019-12-18 RX ADMIN — CEFAZOLIN SODIUM 2 G: 2 INJECTION, SOLUTION INTRAVENOUS at 14:05

## 2019-12-18 RX ADMIN — OXYCODONE HYDROCHLORIDE 5 MG: 5 TABLET ORAL at 00:00

## 2019-12-18 RX ADMIN — ONDANSETRON 4 MG: 2 INJECTION INTRAMUSCULAR; INTRAVENOUS at 12:25

## 2019-12-18 RX ADMIN — OXYCODONE HYDROCHLORIDE 10 MG: 5 SOLUTION ORAL at 13:05

## 2019-12-18 RX ADMIN — SODIUM HYPOCHLORITE 1 ML: 1.25 SOLUTION TOPICAL at 06:15

## 2019-12-18 ASSESSMENT — ENCOUNTER SYMPTOMS
CONSTIPATION: 0
BACK PAIN: 0
INSOMNIA: 0
FEVER: 0
ABDOMINAL PAIN: 0
DEPRESSION: 0
SEIZURES: 0
SHORTNESS OF BREATH: 0
MYALGIAS: 0
HEADACHES: 0
COUGH: 0
VOMITING: 0
SENSORY CHANGE: 0
DIZZINESS: 0
PALPITATIONS: 0
WHEEZING: 0
DIARRHEA: 0
WEAKNESS: 0
NERVOUS/ANXIOUS: 1
CHILLS: 1
SPEECH CHANGE: 0
ROS GI COMMENTS: + GAS
FOCAL WEAKNESS: 0
DIAPHORESIS: 0
NAUSEA: 0

## 2019-12-18 ASSESSMENT — LIFESTYLE VARIABLES: SUBSTANCE_ABUSE: 1

## 2019-12-18 ASSESSMENT — PAIN SCALES - GENERAL: PAIN_LEVEL: 4

## 2019-12-18 NOTE — ANESTHESIA TIME REPORT
Anesthesia Start and Stop Event Times     Date Time Event    12/18/2019 1147 Ready for Procedure     1150 Anesthesia Start     1230 Anesthesia Stop        Responsible Staff  12/18/19    Name Role Begin End    Pamela Burrows M.D. Anesth 1150 1230        Preop Diagnosis (Free Text):  Pre-op Diagnosis     Left thigh wound        Preop Diagnosis (Codes):    Post op Diagnosis  Open thigh wound      Premium Reason  Non-Premium    Comments:

## 2019-12-18 NOTE — DISCHARGE PLANNING
Anticipated Discharge Disposition: LTACH    Action: LTACH referral received. LSW met with pt at bedside to discuss recommendation for LTACH. Pt agreeable and signed CHOICE for [facility].    LSW faxed CHOICE to [facility].    Barriers to Discharge: None    Plan: Await acceptance at [facility]

## 2019-12-18 NOTE — ANESTHESIA PROCEDURE NOTES
Airway  Date/Time: 12/18/2019 11:56 AM  Performed by: Pamela Burrows M.D.  Authorized by: Pamela Burrows M.D.     Location:  OR  Urgency:  Elective  Indications for Airway Management:  Anesthesia  Spontaneous Ventilation: absent    Sedation Level:  Deep  Preoxygenated: Yes    Patient Position:  Sniffing  Mask Difficulty Assessment:  2 - vent by mask + OA or adjuvant +/- NMBA  Final Airway Type:  Endotracheal airway  Final Endotracheal Airway:  ETT  Cuffed: Yes    Technique Used for Successful ETT Placement:  Direct laryngoscopy  Insertion Site:  Oral  Blade Type:  [place]  Laryngoscope Blade/Videolaryngoscope Blade Size:  4  ETT Size (mm):  8.0  Measured from:  Teeth  ETT to Teeth (cm):  23  Placement Verified by: auscultation and capnometry    Cormack-Lehane Classification:  Grade I - full view of glottis  Number of Attempts at Approach:  1

## 2019-12-18 NOTE — PROGRESS NOTES
Received report from night RN, assumed care at 0700. Pt A&OX4, able to specify needs. Pt's family at bedside. Pt made NPO at 0730 per new order. Dressings to wounds CDI. Pt resting in bed, no signs/symptoms of distress noted. Fall precautions in place. Pt states all needs met at this time. POC discussed, communication board updated. Call light in reach, hourly rounding in place.

## 2019-12-18 NOTE — PROGRESS NOTES
"Received change of shift report from day-shift RN and assumed care of patient at 1900. Assessment performed. Patient is alert and oriented x4. PRN pain medications given per MAR. BP stable. Pt refusing wound care at this time stating he is still in pain from earlier wound care. Patient call light within reach, personal possessions nearby, bed in low position and locked, hourly rounding in practice, and non-skid socks in place. /69   Pulse 72   Temp 36.8 °C (98.3 °F) (Temporal)   Resp 16   Ht 1.88 m (6' 2\")   Wt 87.5 kg (192 lb 14.4 oz)   SpO2 95%   BMI 24.77 kg/m²     "

## 2019-12-18 NOTE — ANESTHESIA POSTPROCEDURE EVALUATION
Patient: Jaime Gundersno    Procedure Summary     Date:  12/18/19 Room / Location:  [place] [address] / SURGERY [facility]    Anesthesia Start:  1150 Anesthesia Stop:  1230    Procedure:  IRRIGATION AND DEBRIDEMENT, WOUND - THIGH (Left Leg Upper) Diagnosis:  (Left thigh wound)    Surgeon:  Flavio Ward M.D. Responsible Provider:  Pamela Burrows M.D.    Anesthesia Type:  general ASA Status:  2          Final Anesthesia Type: general  Last vitals  BP   Blood Pressure: 110/51    Temp   36.3 °C (97.3 °F)    Pulse   Pulse: 73   Resp   20    SpO2   97 %      Anesthesia Post Evaluation    Patient location during evaluation: PACU  Patient participation: complete - patient participated  Level of consciousness: awake and alert  Pain score: 4    Airway patency: patent  Anesthetic complications: no  Cardiovascular status: hemodynamically stable  Respiratory status: acceptable  Hydration status: euvolemic    PONV: none           Nurse Pain Score: 4 (NPRS)

## 2019-12-18 NOTE — CARE PLAN
Problem: Safety  Goal: Will remain free from falls  Note:   Bed in lowest and locked position, non-skid socks in place, call light in reach, hourly rounding in place.     Problem: Knowledge Deficit  Goal: Knowledge of disease process/condition, treatment plan, diagnostic tests, and medications will improve  Note:   Patient educated about POC for I&D and NPO status.

## 2019-12-18 NOTE — OP REPORT
DATE OF SERVICE:  12/18/2019    PREOPERATIVE DIAGNOSIS:  Left medial thigh abscess.    POSTOPERATIVE DIAGNOSIS:  Left medial thigh abscess.    PROCEDURE:  Irrigation and debridement, left medial thigh abscess.    SURGEON:  Flavio Perales MD    ASSISTANT:  Jase Hart PA-C    ESTIMATED BLOOD LOSS:  Minimal.    INDICATIONS:  This is a 53-year-old IV drug addict who has had a thigh   abscess, unresponsive to nonoperative management.  Risks and benefits of   surgery were discussed, which include but not limited to bleeding, infection,   neurovascular damage, pain, stiffness, and need for further surgery.  He   understands all these risks and wishes to proceed.      DESCRIPTION OF PROCEDURE:  Patient was sedated with LMA anesthesia and   administered preoperative antibiotics.  His left leg was prepped and draped in   usual sterile fashion.  His thigh abscess was debrided of skin, subcutaneous   tissue, underlying muscle in excisional fashion with a knife and rongeur and   irrigated with copious amounts of normal saline solution and was closed with   nylon suture.  Sterile dressings were applied.  Patient tolerated the   procedure well.      POSTOPERATIVE PLAN:  Patient to be weightbearing as tolerated, admitted for   perioperative antibiotics, DVT prophylaxis and care per the infectious disease   service.       ____________________________________     FLAVIO PERALES MD    [facility] / [facility]    DD:  12/18/2019 12:15:15  DT:  12/18/2019 12:46:32    D#:  [id number]  Job#:  [id number]

## 2019-12-18 NOTE — CONSULTS
12/18/2019    Jaime Gunderson is a 53 y.o. male who presents after a IV heroin injection with a left thigh abscess and is here for operative management. Patient denies numbness, parasthesias, loss of concousness or other trauma    History reviewed. No pertinent past medical history.    History reviewed. No pertinent surgical history.    Medications  No current facility-administered medications on file prior to encounter.      No current outpatient medications on file prior to encounter.       Allergies  Patient has no known allergies.    ROS  Left thigh wound. All other systems were reviewed and found to be negative    History reviewed. No pertinent family history.    Social History     Socioeconomic History   • Marital status: Single     Spouse name: Not on file   • Number of children: Not on file   • Years of education: Not on file   • Highest education level: Not on file   Occupational History   • Not on file   Social Needs   • Financial resource strain: Not on file   • Food insecurity:     Worry: Not on file     Inability: Not on file   • Transportation needs:     Medical: Not on file     Non-medical: Not on file   Tobacco Use   • Smoking status: Never Smoker   • Smokeless tobacco: Never Used   Substance and Sexual Activity   • Alcohol use: Never     Frequency: Never   • Drug use: Yes     Types: Intravenous     Comment: heroin   • Sexual activity: Not on file   Lifestyle   • Physical activity:     Days per week: Not on file     Minutes per session: Not on file   • Stress: Not on file   Relationships   • Social connections:     Talks on phone: Not on file     Gets together: Not on file     Attends Restorationism service: Not on file     Active member of club or organization: Not on file     Attends meetings of clubs or organizations: Not on file     Relationship status: Not on file   • Intimate partner violence:     Fear of current or ex partner: Not on file     Emotionally abused: Not on file     Physically  "abused: Not on file     Forced sexual activity: Not on file   Other Topics Concern   • Not on file   Social History Narrative   • Not on file       Physical Exam  Vitals  /83   Pulse 67   Temp 36.8 °C (98.2 °F) (Temporal)   Resp 18   Ht 1.88 m (6' 2\")   Wt 87.5 kg (192 lb 14.4 oz)   SpO2 100%   General: Well Developed, Well Nourished, no acute distress  HEENT: Normocephalic, atraumatic  Eyes: Anicteric, PERRLA, EOMI  Neck: Supple, nontender, no masses  Lungs: CTA, no wheezes or crackles  Heart: RRR, no murmurs, rubs or gallops  Abdomen: Soft, NT, ND  Pelvis: Stable to AP and Lateral Compression  Skin: Intact, no open wounds  Extremities: Left thigh abscess  Neuro: NVI  Vascular: 2+DP/PT, Capillary refill <2 seconds    Radiographs:  EC-ECHOCARDIOGRAM COMPLETE W/O CONT   Final Result      MR-LUMBAR SPINE-WITH & W/O   Final Result      1.  No evidence of discitis/osteomyelitis or abnormal fluid collection.   2.  Congenital/developmental narrowing of the lumbar spinal canal.   3.  L2-S1 degenerative changes as detailed above.      US-DIANE SINGLE LEVEL BILAT   Final Result      US-EXTREMITY ARTERY LOWER UNILAT W/DIANE (COMBO) LEFT         CT-EXTREMITY, LOWER WITH LEFT   Final Result      1.  Extensive soft tissue induration and/or edema is identified throughout the subcutaneous fat and intramuscular fat planes of the left lower extremity. Findings could be due to cellulitis or edema.      2.  No soft tissue abscess is appreciated.      3.  No bone erosion is identified that would suggest osteomyelitis.      4.  Prominent dilated superficial veins are identified likely related to congestion inflammation or infection.      5.  Enlarged left inguinal lymph node is identified likely due to inflammation or infection.          Laboratory Values  Recent Labs     12/16/19  0258 12/17/19  0327   WBC 7.7 6.4   RBC 4.58* 5.10   HEMOGLOBIN 13.6* 15.1   HEMATOCRIT 40.8* 45.5   MCV 89.1 89.2   MCH 29.7 29.6   MCHC 33.3* 33.2* "   RDW 44.0 43.5   PLATELETCT 257 317   MPV 10.1 10.0     Recent Labs     12/16/19  0258 12/17/19  0327   SODIUM 140 140   POTASSIUM 4.0 4.8   CHLORIDE 106 104   CO2 25 27   GLUCOSE 102* 109*   BUN 11 14             Impression: Left thigh abscess    Plan:Operative intervention. Risks and benefits of surgery were discussed which include but are not limited to bleeding, infection, neurovascular damage, malunion, nonunion, instability, limb length discrepancy, DVT, PE, MI, Stroke and death. They understand these risks and wish to proceed.

## 2019-12-18 NOTE — CARE PLAN
Problem: Communication  Goal: The ability to communicate needs accurately and effectively will improve  Outcome: PROGRESSING AS EXPECTED  Intervention: Educate patient and significant other/support system about the plan of care, procedures, treatments, medications and allow for questions  Note:   Pt using call light to make needs known.      Problem: Pain Management  Goal: Pain level will decrease to patient's comfort goal  Outcome: PROGRESSING AS EXPECTED  Intervention: Follow pain managment plan developed in collaboration with patient and Interdisciplinary Team  Note:   Pt given pain medication per MAR.

## 2019-12-18 NOTE — OR NURSING
"Patient arrived, in PACU, with complaints of nausea and pain.  Medicated for both with relief of nausea and no relief of pain.  Medicated as ordered with pain subsiding to tolerable level for transfer to room  Tolerating fluids without further complaints of nausea  Ice pack placed on upper area of left lower leg for complaint of \"burning\" at incision site  Transferred to room via bed  "

## 2019-12-18 NOTE — DISCHARGE PLANNING
@8280  Agency/Facility Name: [facility]  Spoke To: Brandy  Outcome: Pending review.    Received Choice form at 9911  Agency/Facility Name: [facility]  Referral sent per Choice form @ 9025

## 2019-12-18 NOTE — ANESTHESIA PROCEDURE NOTES
Airway  Date/Time: 12/18/2019 11:55 AM  Performed by: Pamela Burrows M.D.  Authorized by: Pamela Burrows M.D.     Location:  OR  Urgency:  Elective  Indications for Airway Management:  Anesthesia  Spontaneous Ventilation: absent    Sedation Level:  Deep  Preoxygenated: Yes    Mask Difficulty Assessment:  0 - not attempted  Final Airway Type:  Supraglottic airway  Final Supraglottic Airway:  Standard LMA  SGA Size:  5  Number of Attempts at Approach:  1   After placement bronchspasm - intubated pt. Easy intubation.

## 2019-12-18 NOTE — ANESTHESIA PREPROCEDURE EVALUATION
54 yo heroine abuse. No known cardiopulm probs. Echo NL.    Relevant Problems   No relevant active problems       Physical Exam    Airway   Mallampati: II  TM distance: >3 FB  Neck ROM: full       Cardiovascular - normal exam  Rhythm: regular  Rate: normal  (-) murmur     Dental - normal exam         Pulmonary - normal exam  Breath sounds clear to auscultation     Abdominal    Neurological - normal exam                 Anesthesia Plan    ASA 2       Plan - general       Airway plan will be LMA        Induction: intravenous    Postoperative Plan: Postoperative administration of opioids is intended.    Pertinent diagnostic labs and testing reviewed    Informed Consent:    Anesthetic plan and risks discussed with patient.    Use of blood products discussed with: patient whom consented to blood products.

## 2019-12-18 NOTE — PROGRESS NOTES
Hospital Medicine Daily Progress Note    Date of Service  12/18/2019    Chief Complaint  Wound check    Hospital Course   Mr. Gunderson is a 54 y/o male who presented to the ED on 12/15/19 with a possible abscess to his right forearm in addition to 2 large open wounds on the inside of his left thigh with a large amount of purulent drainage where he has been injecting heroin. He was told he would need medical clearance prior to undergoing rehab through [facility]. A CT scan was done and showed extensive soft tissue induration and/or edema throughout the subcutaneous fat and intramuscular fat planes of the left lower extremity which could be due to cellulitis or edema. There was no appreciable abscess or evidence of osteomyelitis. Wound culture was positive for group A strep and MSSA, and his blood cultures were also positive for MSSA in 1 out of 2 bottles. An echocardiogram, wound consult, and lumbar spine MRI were subsequently ordered. The MRI & TTE were negative. Cardiology was then consulted on 12/18/19 for BAILEY. An US of the LE was also done and had no evidence of arterial insufficiency. The wound care RN also recommended a surgical consult for possible I&D, which was performed on 12/18/19 by Dr. Ward.         Interval Problem Update  Having cold spells, chills, sneezing, gas, anxiety, & irritability. Thinks it is worsening withdrawals. Methadone increased to BID.     Orthopedic surgery consulted, pt going for I&D of the left leg today.     Cardiology consulted for BAILEY.     No labs done today.   Repeat BC drawn 12/17/19 are still in process.     Afebrile overnight, HR 70s, SBP 100s-120s, O2 sats WNL on room air.     Consultants/Specialty  Infectious disease  Cardiology  Orthopedic surgery    Code Status  Full code    Disposition  Clinical for now. Anticipate LTAC when medically clear.     Review of Systems  Review of Systems   Constitutional: Positive for chills. Negative for diaphoresis,  fever and malaise/fatigue.        + cold spells   HENT:        +sneezing   Respiratory: Negative for cough, shortness of breath and wheezing.    Cardiovascular: Negative for chest pain, palpitations and leg swelling.   Gastrointestinal: Negative for abdominal pain, constipation, diarrhea, nausea and vomiting.        + gas   Genitourinary: Negative for dysuria, frequency and urgency.   Musculoskeletal: Negative for back pain, joint pain and myalgias.   Skin:        + multiple wounds throughout his body (left anterior shin, right forearm, left upper medial thigh, left lower anterior medial shin); purulent drainage noted from inner thigh wounds   Neurological: Negative for dizziness, sensory change, speech change, focal weakness, seizures, weakness and headaches.   Psychiatric/Behavioral: Positive for substance abuse (IV heroin). Negative for depression. The patient is nervous/anxious. The patient does not have insomnia.         +irritability   All other systems reviewed and are negative.     Physical Exam  Temp:  [36.2 °C (97.1 °F)-37.2 °C (98.9 °F)] 36.3 °C (97.3 °F)  Pulse:  [65-80] 73  Resp:  [12-20] 20  BP: ()/(51-87) 110/51  SpO2:  [90 %-100 %] 97 %    Physical Exam  Vitals signs and nursing note reviewed.   Constitutional:       General: He is awake. He is not in acute distress.     Appearance: Normal appearance. He is well-developed and overweight. He is not ill-appearing.   HENT:      Head: Normocephalic and atraumatic.      Mouth/Throat:      Lips: Pink.      Mouth: Mucous membranes are moist.   Eyes:      Conjunctiva/sclera: Conjunctivae normal.      Pupils: Pupils are equal, round, and reactive to light.   Neck:      Musculoskeletal: Normal range of motion and neck supple.      Vascular: No JVD.   Cardiovascular:      Rate and Rhythm: Normal rate and regular rhythm.      Pulses: Normal pulses.      Heart sounds: Normal heart sounds.   Pulmonary:      Effort: Pulmonary effort is normal.      Breath  sounds: Normal breath sounds.   Abdominal:      General: Bowel sounds are normal. There is no distension or abdominal bruit.      Palpations: Abdomen is soft.      Tenderness: There is no tenderness.   Musculoskeletal:      Lumbar back: He exhibits no tenderness, no bony tenderness and no pain.      Right lower leg: No edema.      Left lower leg: No edema.   Skin:     General: Skin is warm and dry.      Findings: Abscess, erythema, signs of injury and wound present.          Neurological:      General: No focal deficit present.      Mental Status: He is alert and oriented to person, place, and time.      GCS: GCS eye subscore is 4. GCS verbal subscore is 5. GCS motor subscore is 6.      Cranial Nerves: Cranial nerves are intact.      Sensory: Sensation is intact.      Motor: Motor function is intact.      Coordination: Coordination is intact.      Gait: Gait is intact.   Psychiatric:         Attention and Perception: Attention and perception normal.         Mood and Affect: Affect normal. Mood is anxious.         Speech: Speech normal.         Behavior: Behavior normal. Behavior is cooperative.         Thought Content: Thought content normal.         Cognition and Memory: Cognition and memory normal.         Judgment: Judgment normal.     Fluids    Intake/Output Summary (Last 24 hours) at 12/18/2019 1443  Last data filed at 12/18/2019 1330  Gross per 24 hour   Intake 2100 ml   Output 2003 ml   Net 97 ml     Laboratory  Recent Labs     12/16/19  0258 12/17/19  0327   WBC 7.7 6.4   RBC 4.58* 5.10   HEMOGLOBIN 13.6* 15.1   HEMATOCRIT 40.8* 45.5   MCV 89.1 89.2   MCH 29.7 29.6   MCHC 33.3* 33.2*   RDW 44.0 43.5   PLATELETCT 257 317   MPV 10.1 10.0     Recent Labs     12/16/19  0258 12/17/19  0327   SODIUM 140 140   POTASSIUM 4.0 4.8   CHLORIDE 106 104   CO2 25 27   GLUCOSE 102* 109*   BUN 11 14   CREATININE 0.63 0.72   CALCIUM 8.9 9.1     Imaging  EC-ECHOCARDIOGRAM COMPLETE W/O CONT   Final Result      MR-LUMBAR  SPINE-WITH & W/O   Final Result      1.  No evidence of discitis/osteomyelitis or abnormal fluid collection.   2.  Congenital/developmental narrowing of the lumbar spinal canal.   3.  L2-S1 degenerative changes as detailed above.      US-DIANE SINGLE LEVEL BILAT   Final Result      US-EXTREMITY ARTERY LOWER UNILAT W/DIANE (COMBO) LEFT         CT-EXTREMITY, LOWER WITH LEFT   Final Result      1.  Extensive soft tissue induration and/or edema is identified throughout the subcutaneous fat and intramuscular fat planes of the left lower extremity. Findings could be due to cellulitis or edema.      2.  No soft tissue abscess is appreciated.      3.  No bone erosion is identified that would suggest osteomyelitis.      4.  Prominent dilated superficial veins are identified likely related to congestion inflammation or infection.      5.  Enlarged left inguinal lymph node is identified likely due to inflammation or infection.      EC-BAILEY W/O CONT    (Results Pending)      Assessment/Plan  * Wound infection, left inner thigh- (present on admission)  Assessment & Plan  -CT scan negative for abscess or osteomyelitis.   -ID following.   -Wound culture + MSSA & group A strep. BC also + for MSSA.   -Continue abx per ID.   -Surgical I&D scheduled for today (to be done by Dr. Ward).   -Continue wound care.     Bacteremia due to methicillin resistant Staphylococcus aureus- (present on admission)  Assessment & Plan  -ID following.  -TTE negative, cardiology consulted today, will schedule BAILEY.    -MRI lumbar spine with contrast ordered due to L-spine pain & point tenderness, was negative for abscess, diskitis, osteo.   -Abx per ID. Currently on IV ancef.     LLE Cellulitis- (present on admission)  Assessment & Plan  -CT scan negative for abscess or osteomyelitis.   -ID following. Continue abx per their recs.   -Clinically improving. Continue to monitor progression.     Heroin abuse (HCC)- (present on admission)  Assessment &  Plan  -Continue to reiterate the importance of cessation.   -Increased methadone for continued withdrawal symptoms.      VTE prophylaxis: Lovenox      Electronically signed by:  Tona Caba, MSN, RN, APRN, ACNPC-AG, CCRN  Nurse Practitioner, [facility]  Work # [phone]  Cell # [phone] (call, text, or tiger text)    12/18/2019    2:44 PM

## 2019-12-18 NOTE — CONSULTS
Reason for Consult:  Asked by Dr Shaun Malcolm M.D. to see this patient with MSSA bacteremia  Patient's PCP: No primary care provider on file.    CC:   Chief Complaint   Patient presents with   • Wound Check       HPI:      53 year old man with PMH IV drug abuse with heroine and methamphetamine abuse presents with medial thigh leg infection due to contaminated needle. Developed into abscess and patient attempted to self incise and drain. Otherwise, denies chest pain, shortness of breath, orthopnea, pnd, ext swelling, lh/dizziness, syncope. He is able to exert without cardiac symptoms. He quit tobacco, he claims. Family at bedside deny cardiac history but admit to diabetes running in the family. Since admission has been found to have MSSA bacteremia. Initial TTE was negative for endocarditis. He is pending incision and drainage of the culprit abscess.     Medications / Drug list prior to admission:  No current facility-administered medications on file prior to encounter.      No current outpatient medications on file prior to encounter.       Current list of administered Medications:    Current Facility-Administered Medications:   •  methadone (DOLOPHINE) tablet 10 mg, 10 mg, Oral, BID, Tona Caba, A.P.R.N.  •  dakins 0.125% (1/4 strength) topical soln, , Topical, BID, Wound Care Nurse, R.N., 1 mL at 12/18/19 0615  •  ceFAZolin in dextrose (ANCEF) IVPB premix 2 g, 2 g, Intravenous, Q8HRS, Claribel Antunez M.D., Stopped at 12/18/19 0644  •  senna-docusate (PERICOLACE or SENOKOT S) 8.6-50 MG per tablet 2 Tab, 2 Tab, Oral, BID, 2 Tab at 12/18/19 0614 **AND** polyethylene glycol/lytes (MIRALAX) PACKET 1 Packet, 1 Packet, Oral, QDAY PRN **AND** magnesium hydroxide (MILK OF MAGNESIA) suspension 30 mL, 30 mL, Oral, QDAY PRN **AND** bisacodyl (DULCOLAX) suppository 10 mg, 10 mg, Rectal, QDAY PRN, Junito Gonzales M.D.  •  enoxaparin (LOVENOX) inj 40 mg, 40 mg, Subcutaneous, DAILY, Junito Gonzales M.D., 40 mg at  12/18/19 0614  •  acetaminophen (TYLENOL) tablet 650 mg, 650 mg, Oral, Q6HRS PRN, Junito Gonzales M.D.  •  Notify provider if pain remains uncontrolled, , , CONTINUOUS **AND** Use the numeric rating scale (NRS-11) on regular floors and Critical-Care Pain Observation Tool (CPOT) on ICUs/Trauma to assess pain, , , CONTINUOUS **AND** Pulse Ox (Oximetry), , , CONTINUOUS **AND** Pharmacy Consult Request ...Pain Management Review 1 Each, 1 Each, Other, PHARMACY TO DOSE **AND** If patient difficult to arouse and/or has respiratory depression, stop any opiates that are currently infusing and call a Rapid Response., , , CONTINUOUS **AND** oxyCODONE immediate-release (ROXICODONE) tablet 5 mg, 5 mg, Oral, Q3HRS PRN, 5 mg at 12/18/19 0615 **AND** oxyCODONE immediate-release (ROXICODONE) tablet 10 mg, 10 mg, Oral, Q3HRS PRN, 10 mg at 12/17/19 1351 **AND** morphine (pf) 4 MG/ML injection 4 mg, 4 mg, Intravenous, Q3HRS PRN, Junito Gonzales M.D., 4 mg at 12/17/19 1458  •  ondansetron (ZOFRAN) syringe/vial injection 4 mg, 4 mg, Intravenous, Q4HRS PRN, Junito Gonzales M.D.  •  ondansetron (ZOFRAN ODT) dispertab 4 mg, 4 mg, Oral, Q4HRS PRN, Junito Gonzales M.D.  •  promethazine (PHENERGAN) tablet 12.5-25 mg, 12.5-25 mg, Oral, Q4HRS PRN, Junito Gonzales M.D.  •  promethazine (PHENERGAN) suppository 12.5-25 mg, 12.5-25 mg, Rectal, Q4HRS PRN, Junito Gonzales M.D.  •  prochlorperazine (COMPAZINE) injection 5-10 mg, 5-10 mg, Intravenous, Q4HRS PRN, YUMIKO LangtsonD.    History reviewed. No pertinent past medical history.    History reviewed. No pertinent surgical history.    History reviewed. No pertinent family history.  Patient family history was personally reviewed, no pertinent family history to current presentation    Social History     Socioeconomic History   • Marital status: Single     Spouse name: Not on file   • Number of children: Not on file   • Years of education: Not on file   • Highest education level: Not on file   Occupational  History   • Not on file   Social Needs   • Financial resource strain: Not on file   • Food insecurity:     Worry: Not on file     Inability: Not on file   • Transportation needs:     Medical: Not on file     Non-medical: Not on file   Tobacco Use   • Smoking status: Never Smoker   • Smokeless tobacco: Never Used   Substance and Sexual Activity   • Alcohol use: Never     Frequency: Never   • Drug use: Yes     Types: Intravenous     Comment: heroin   • Sexual activity: Not on file   Lifestyle   • Physical activity:     Days per week: Not on file     Minutes per session: Not on file   • Stress: Not on file   Relationships   • Social connections:     Talks on phone: Not on file     Gets together: Not on file     Attends Anabaptism service: Not on file     Active member of club or organization: Not on file     Attends meetings of clubs or organizations: Not on file     Relationship status: Not on file   • Intimate partner violence:     Fear of current or ex partner: Not on file     Emotionally abused: Not on file     Physically abused: Not on file     Forced sexual activity: Not on file   Other Topics Concern   • Not on file   Social History Narrative   • Not on file       ALLERGIES:  No Known Allergies    Review of systems:  A complete review of symptoms was reviewed with patient. This is reviewed in H&P and PMH. ALL OTHERS reviewed and negative    Physical exam:  Patient Vitals for the past 24 hrs:   BP Temp Temp src Pulse Resp SpO2   12/18/19 0740 101/82 37.2 °C (98.9 °F) Temporal 66 18 98 %   12/18/19 0245 120/85 36.3 °C (97.3 °F) Temporal 70 16 99 %   12/17/19 2059 100/69 36.8 °C (98.3 °F) Temporal 72 16 95 %   12/17/19 1720 (!) 87/52 36.4 °C (97.6 °F) Temporal 75 16 95 %     General: No acute distress.   EYES: no jaundice  HEENT: OP clear   Neck: No bruits No JVD.   CVS:  RRR. S1 + S2. No M/R/G.  No edema.  Resp: CTAB. No wheezing or crackles/rhonchi.  Abdomen: Soft, NT, ND,  Skin: Grossly nothing acute no obvious  rashes  Neurological: Alert, Moves all extremities, no cranial nerve defects on limited exam  Extremities: Pulse 2+ in b/l LE.  No cyanosis. Right thigh bandaged      Data:  Laboratory studies personally reviewed by me:  Recent Results (from the past 24 hour(s))   EC-ECHOCARDIOGRAM COMPLETE W/O CONT    Collection Time: 12/17/19  2:30 PM   Result Value Ref Range    Eject.Frac. MOD 4C 48.74     Left Ventrical Ejection Fraction 55        Imaging:  EC-ECHOCARDIOGRAM COMPLETE W/O CONT   Final Result      MR-LUMBAR SPINE-WITH & W/O   Final Result      1.  No evidence of discitis/osteomyelitis or abnormal fluid collection.   2.  Congenital/developmental narrowing of the lumbar spinal canal.   3.  L2-S1 degenerative changes as detailed above.      US-DIANE SINGLE LEVEL BILAT   Final Result      US-EXTREMITY ARTERY LOWER UNILAT W/DIANE (COMBO) LEFT         CT-EXTREMITY, LOWER WITH LEFT   Final Result      1.  Extensive soft tissue induration and/or edema is identified throughout the subcutaneous fat and intramuscular fat planes of the left lower extremity. Findings could be due to cellulitis or edema.      2.  No soft tissue abscess is appreciated.      3.  No bone erosion is identified that would suggest osteomyelitis.      4.  Prominent dilated superficial veins are identified likely related to congestion inflammation or infection.      5.  Enlarged left inguinal lymph node is identified likely due to inflammation or infection.          CT ext  1.  Extensive soft tissue induration and/or edema is identified throughout the subcutaneous fat and intramuscular fat planes of the left lower extremity. Findings could be due to cellulitis or edema.  2.  No soft tissue abscess is appreciated.  3.  No bone erosion is identified that would suggest osteomyelitis.  4.  Prominent dilated superficial veins are identified likely related to congestion inflammation or infection.  5.  Enlarged left inguinal lymph node is identified likely due to  inflammation or infection.    TTE  CONCLUSIONS  No prior study is available for comparison.   Normal left ventricular systolic function.   No evidence of valvular abnormality based on Doppler evaluation.     Principal Problem:    Wound infection, left inner thigh POA: Yes  Active Problems:    LLE Cellulitis POA: Yes    Bacteremia due to methicillin resistant Staphylococcus aureus POA: Yes    Heroin abuse (HCC) POA: Yes  Resolved Problems:    Hyponatremia POA: Yes      Assessment / Plan:    53 year old man with PMH IV drug abuse with heroine and methamphetamine abuse presents with medial thigh leg infection due to contaminated needle, found abscess and MSSA bacteremia 12/15/19    -by criteria, possible endocarditis  -pending I+D today  -latest blood cultures negative to date. Recommend attempt abx therapy 5 days if bacterema persists then BAILEY is very reasonable.     I personally discussed his case with  Dr Shaun Malcolm M.D.    It is my pleasure to participate in the care of Mr. Gunderson.  Please do not hesitate to contact me with questions or concerns.    Dashawn Antony MD  Cardiologist [facility]

## 2019-12-19 LAB
ANION GAP SERPL CALC-SCNC: 11 MMOL/L (ref 0–11.9)
BACTERIA BLD CULT: ABNORMAL
BACTERIA BLD CULT: ABNORMAL
BASOPHILS # BLD AUTO: 0.2 % (ref 0–1.8)
BASOPHILS # BLD: 0.04 K/UL (ref 0–0.12)
BUN SERPL-MCNC: 21 MG/DL (ref 8–22)
CALCIUM SERPL-MCNC: 9.1 MG/DL (ref 8.5–10.5)
CHLORIDE SERPL-SCNC: 103 MMOL/L (ref 96–112)
CO2 SERPL-SCNC: 21 MMOL/L (ref 20–33)
CREAT SERPL-MCNC: 0.75 MG/DL (ref 0.5–1.4)
EOSINOPHIL # BLD AUTO: 0 K/UL (ref 0–0.51)
EOSINOPHIL NFR BLD: 0 % (ref 0–6.9)
ERYTHROCYTE [DISTWIDTH] IN BLOOD BY AUTOMATED COUNT: 43.5 FL (ref 35.9–50)
GLUCOSE SERPL-MCNC: 128 MG/DL (ref 65–99)
HCT VFR BLD AUTO: 45.7 % (ref 42–52)
HGB BLD-MCNC: 15 G/DL (ref 14–18)
IMM GRANULOCYTES # BLD AUTO: 0.28 K/UL (ref 0–0.11)
IMM GRANULOCYTES NFR BLD AUTO: 1.7 % (ref 0–0.9)
LYMPHOCYTES # BLD AUTO: 1.18 K/UL (ref 1–4.8)
LYMPHOCYTES NFR BLD: 7.1 % (ref 22–41)
MCH RBC QN AUTO: 29.3 PG (ref 27–33)
MCHC RBC AUTO-ENTMCNC: 32.8 G/DL (ref 33.7–35.3)
MCV RBC AUTO: 89.3 FL (ref 81.4–97.8)
MONOCYTES # BLD AUTO: 0.45 K/UL (ref 0–0.85)
MONOCYTES NFR BLD AUTO: 2.7 % (ref 0–13.4)
NEUTROPHILS # BLD AUTO: 14.61 K/UL (ref 1.82–7.42)
NEUTROPHILS NFR BLD: 88.3 % (ref 44–72)
NRBC # BLD AUTO: 0 K/UL
NRBC BLD-RTO: 0 /100 WBC
PLATELET # BLD AUTO: 381 K/UL (ref 164–446)
PMV BLD AUTO: 9.9 FL (ref 9–12.9)
POTASSIUM SERPL-SCNC: 4.7 MMOL/L (ref 3.6–5.5)
RBC # BLD AUTO: 5.12 M/UL (ref 4.7–6.1)
SIGNIFICANT IND 70042: ABNORMAL
SITE SITE: ABNORMAL
SODIUM SERPL-SCNC: 135 MMOL/L (ref 135–145)
SOURCE SOURCE: ABNORMAL
WBC # BLD AUTO: 16.6 K/UL (ref 4.8–10.8)

## 2019-12-19 PROCEDURE — 36415 COLL VENOUS BLD VENIPUNCTURE: CPT

## 2019-12-19 PROCEDURE — A9270 NON-COVERED ITEM OR SERVICE: HCPCS | Performed by: HOSPITALIST

## 2019-12-19 PROCEDURE — 85025 COMPLETE CBC W/AUTO DIFF WBC: CPT

## 2019-12-19 PROCEDURE — A9270 NON-COVERED ITEM OR SERVICE: HCPCS | Performed by: NURSE PRACTITIONER

## 2019-12-19 PROCEDURE — 700111 HCHG RX REV CODE 636 W/ 250 OVERRIDE (IP): Performed by: HOSPITALIST

## 2019-12-19 PROCEDURE — 700102 HCHG RX REV CODE 250 W/ 637 OVERRIDE(OP): Performed by: HOSPITALIST

## 2019-12-19 PROCEDURE — 80048 BASIC METABOLIC PNL TOTAL CA: CPT

## 2019-12-19 PROCEDURE — 770006 HCHG ROOM/CARE - MED/SURG/GYN SEMI*

## 2019-12-19 PROCEDURE — 700102 HCHG RX REV CODE 250 W/ 637 OVERRIDE(OP): Performed by: NURSE PRACTITIONER

## 2019-12-19 PROCEDURE — 99232 SBSQ HOSP IP/OBS MODERATE 35: CPT | Performed by: INTERNAL MEDICINE

## 2019-12-19 RX ADMIN — OXYCODONE HYDROCHLORIDE 10 MG: 10 TABLET ORAL at 22:27

## 2019-12-19 RX ADMIN — METHADONE HYDROCHLORIDE 10 MG: 10 TABLET ORAL at 05:31

## 2019-12-19 RX ADMIN — SENNOSIDES AND DOCUSATE SODIUM 2 TABLET: 8.6; 5 TABLET ORAL at 05:31

## 2019-12-19 RX ADMIN — METHADONE HYDROCHLORIDE 10 MG: 10 TABLET ORAL at 17:15

## 2019-12-19 RX ADMIN — CEFAZOLIN SODIUM 2 G: 2 INJECTION, SOLUTION INTRAVENOUS at 14:40

## 2019-12-19 RX ADMIN — CEFAZOLIN SODIUM 2 G: 2 INJECTION, SOLUTION INTRAVENOUS at 05:31

## 2019-12-19 RX ADMIN — MORPHINE SULFATE 4 MG: 4 INJECTION INTRAVENOUS at 01:10

## 2019-12-19 RX ADMIN — CEFAZOLIN SODIUM 2 G: 2 INJECTION, SOLUTION INTRAVENOUS at 22:27

## 2019-12-19 RX ADMIN — ENOXAPARIN SODIUM 40 MG: 100 INJECTION SUBCUTANEOUS at 05:31

## 2019-12-19 RX ADMIN — OXYCODONE HYDROCHLORIDE 5 MG: 5 TABLET ORAL at 10:24

## 2019-12-19 RX ADMIN — SENNOSIDES AND DOCUSATE SODIUM 2 TABLET: 8.6; 5 TABLET ORAL at 17:15

## 2019-12-19 RX ADMIN — POLYETHYLENE GLYCOL 3350 1 PACKET: 17 POWDER, FOR SOLUTION ORAL at 20:32

## 2019-12-19 ASSESSMENT — ENCOUNTER SYMPTOMS
FOCAL WEAKNESS: 0
CONSTIPATION: 0
WEAKNESS: 0
BACK PAIN: 0
SEIZURES: 0
COUGH: 0
HEADACHES: 0
ABDOMINAL PAIN: 0
DIZZINESS: 0
NERVOUS/ANXIOUS: 0
DIARRHEA: 0
SENSORY CHANGE: 0
SHORTNESS OF BREATH: 0
HALLUCINATIONS: 0
DEPRESSION: 0
WHEEZING: 0
NECK PAIN: 0
NAUSEA: 0
VOMITING: 0
CHILLS: 0
SPEECH CHANGE: 0
DIAPHORESIS: 0
MYALGIAS: 0
PALPITATIONS: 0
MYALGIAS: 1
FEVER: 0
INSOMNIA: 0

## 2019-12-19 ASSESSMENT — LIFESTYLE VARIABLES: SUBSTANCE_ABUSE: 1

## 2019-12-19 NOTE — PROGRESS NOTES
POD#1  S/P I&D thigh abcess  WBAT  Operative cultures pending  ABX per ID  No future surgery planned

## 2019-12-19 NOTE — PROGRESS NOTES
Infectious Disease Progress Note    Author: Stephanie Perez M.D. Date & Time of service: 2019  11:22 AM    Chief Complaint:  MSSA bacteremia     Interval history:    AF, new left leg nodular lesion concerning for abscess.  Ongoing purulent drainage from left thigh abscess.  Patient had TTE today and awaiting results.    Review of Systems:  Review of Systems   Constitutional: Negative for chills, fever and malaise/fatigue.   Gastrointestinal: Negative for abdominal pain, constipation, diarrhea, nausea and vomiting.   Musculoskeletal: Positive for myalgias. Negative for back pain, joint pain and neck pain.   Neurological: Negative for headaches.       Hemodynamics:  Temp (24hrs), Av.2 °C (97.2 °F), Min:35.3 °C (95.6 °F), Max:36.7 °C (98 °F)  Temperature: 36.7 °C (98 °F)  Pulse  Av.6  Min: 57  Max: 88   Blood Pressure: 114/76       Physical Exam:  Physical Exam  Constitutional:       Appearance: Normal appearance.   Cardiovascular:      Rate and Rhythm: Normal rate and regular rhythm.      Heart sounds: Normal heart sounds.   Pulmonary:      Effort: Pulmonary effort is normal.      Breath sounds: Normal breath sounds.   Abdominal:      General: Abdomen is flat. Bowel sounds are normal.      Palpations: Abdomen is soft.   Musculoskeletal:         General: Swelling and tenderness present.      Left lower leg: Edema present.   Skin:     General: Skin is warm and dry.   Neurological:      General: No focal deficit present.      Mental Status: He is alert and oriented to person, place, and time.   Psychiatric:         Mood and Affect: Mood normal.         Behavior: Behavior normal.         Meds:    Current Facility-Administered Medications:   •  methadone  •  ceFAZolin  •  senna-docusate **AND** polyethylene glycol/lytes **AND** magnesium hydroxide **AND** bisacodyl  •  enoxaparin  •  acetaminophen  •  Notify provider if pain remains uncontrolled **AND** Use the numeric rating scale (NRS-11) on  regular floors and Critical-Care Pain Observation Tool (CPOT) on ICUs/Trauma to assess pain **AND** Pulse Ox (Oximetry) **AND** Pharmacy Consult Request **AND** If patient difficult to arouse and/or has respiratory depression, stop any opiates that are currently infusing and call a Rapid Response. **AND** oxyCODONE immediate-release **AND** oxyCODONE immediate-release **AND** morphine injection  •  ondansetron  •  ondansetron  •  promethazine  •  promethazine  •  prochlorperazine    Labs:  Recent Labs     12/17/19 0327 12/19/19  0321   WBC 6.4 16.6*   RBC 5.10 5.12   HEMOGLOBIN 15.1 15.0   HEMATOCRIT 45.5 45.7   MCV 89.2 89.3   MCH 29.6 29.3   RDW 43.5 43.5   PLATELETCT 317 381   MPV 10.0 9.9   NEUTSPOLYS 64.40 88.30*   LYMPHOCYTES 18.70* 7.10*   MONOCYTES 10.40 2.70   EOSINOPHILS 3.30 0.00   BASOPHILS 0.90 0.20     Recent Labs     12/17/19  0327 12/19/19  0321   SODIUM 140 135   POTASSIUM 4.8 4.7   CHLORIDE 104 103   CO2 27 21   GLUCOSE 109* 128*   BUN 14 21     Recent Labs     12/17/19  0327 12/19/19  0321   CREATININE 0.72 0.75       Imaging:  Ct-extremity, Lower With Left    Result Date: 12/15/2019  12/15/2019 1:25 PM HISTORY/REASON FOR EXAM:  Left leg swelling and redness. TECHNIQUE/EXAM DESCRIPTION AND NUMBER OF VIEWS:  CT scan of the LEFT lower extremity with contrast, with reconstructions. Thin helical 3 mm sections were obtained from the distal femur through the proximal tibia/fibula. Sagittal and coronal multiplanar reconstructions were generated from the axial images. A total of 80 mL of Omnipaque 350 nonionic contrast was administered IV without complication. Up to date radiation dose reduction adjustments have been utilized to meet ALARA standards for radiation dose reduction. COMPARISON: None. FINDINGS: There is abnormally increased attenuation throughout the subcutaneous fat and intramuscular fat planes in the left lower extremity. This process is most prominent in the lower leg area below the level  of the knee and present to a lesser degree in the thigh region. Prominent dilated veins are identified which are most prominent superficially and medially. No localized fluid collection that would suggest abscess is identified. No bone erosion or bone destruction is identified. Enlarged lymph node in the left inguinal area is identified with short axis diameter 1.8 cm.     1.  Extensive soft tissue induration and/or edema is identified throughout the subcutaneous fat and intramuscular fat planes of the left lower extremity. Findings could be due to cellulitis or edema. 2.  No soft tissue abscess is appreciated. 3.  No bone erosion is identified that would suggest osteomyelitis. 4.  Prominent dilated superficial veins are identified likely related to congestion inflammation or infection. 5.  Enlarged left inguinal lymph node is identified likely due to inflammation or infection.    Mr-lumbar Spine-with & W/o    Result Date: 12/17/2019 12/16/2019 9:59 PM HISTORY/REASON FOR EXAM:  MSSA infection with L-spine pain and point tenderness TECHNIQUE/EXAM DESCRIPTION: MRI of the lumbar spine without and with contrast. The study was performed on a [employer] Signa 1.5 Carlotta MRI scanner. T1 sagittal, T2 fast spin-echo sagittal, and T2 axial images were obtained of the lumbar spine. T1 postcontrast fat-suppressed sagittal images were obtained. 20 mL ProHance contrast was administered intravenously. COMPARISON:  None. FINDINGS: 5 lumbar type vertebral bodies are counted. Conus medullaris terminates at T12-L1 and is normal in signal. Straightening of the lumbar spine. No compression fracture. No suspicious abnormal disc or bone marrow edema or enhancement is seen to suggest discitis osteomyelitis. No epidural fluid collection is seen. There is congenital narrowing of the lumbar spinal canal. T11-L2: Normal. L2-L3: Diffuse disc bulge. Borderline central canal stenosis. Mild right foraminal narrowing. L3-L4: Mild diffuse disc bulge. No  significant spinal or foraminal stenosis. L4-L5: Diffuse disc bulge, posterior annular fissure and disc protrusion. Mild facet degeneration. Mild spinal stenosis. Right lateral recess stenosis which may involve the right L5 nerve. Correlate for right L5 radiculopathy. Mild bilateral foraminal stenosis. L5-S1: Disc narrowing, diffuse disc osteophyte and posterior annular fissure. Borderline central canal stenosis. Lateral recess narrowing, left greater than right and at least moderate foraminal stenosis.     1.  No evidence of discitis/osteomyelitis or abnormal fluid collection. 2.  Congenital/developmental narrowing of the lumbar spinal canal. 3.  L2-S1 degenerative changes as detailed above.    Us-steve Single Level Bilat    Result Date: 2019   Vascular Laboratory  Conclusions  No evidence of arterial insufficiency.  KENDRICK ATWOOD  Age:    53    Gender:     M  MRN:    [id number]  :    1965      BSA:  Exam Date:     2019 08:10  Room #:     Inpatient  Priority:     Routine  Ht (in):             Wt (lb):  Ordering Physician:        VALERIE AVALOS  Referring Physician:       VALERIE AVALOS  Sonographer:               Morales Suggs RVT, RDMS  Study Type:                Complete Bilateral  Technical Quality:         Adequate  Indications:     Atherosclerosis of native arteries of left leg with                   ulceration of unspecified site  CPT Codes:       77155  ICD Codes:       I70.249  History:         Nonhealing wound of left lower extremity  Limitations:     Unable to obtain right brachial pressure due to line in arm,                    Unable to obtain left popliteal waveform due to wound                   dressing                 RIGHT  Waveform            Systolic BPs (mmHg)                                           Brachial  Triphasic                                Common Femoral  Triphasic                  135           Posterior Tibial  Triphasic                   137           Dorsalis Pedis                                           Peroneal                             1.18          DIANE                                           TBI                       LEFT  Waveform        Systolic BPs (mmHg)                             116           Brachial  Triphasic                                Common Femoral  Triphasic                  132           Posterior Tibial  Triphasic                  134           Dorsalis Pedis                                           Peroneal                             1.16          DIANE                                           TBI  Findings  Bilateral.  Doppler waveform of the common femoral artery is of high amplitude and  triphasic.  Doppler waveforms at the ankle are brisk and triphasic.  Ankle-brachial index is normal.  Additional testing was not performed in accordance with lower extremity  arterial evaluation protocol.  Charan Cruz MD  (Electronically Signed)  Final Date:      2019                   10:58    Ec-echocardiogram Complete W/o Cont    Result Date: 2019  Transthoracic Echo Report Echocardiography Laboratory CONCLUSIONS No prior study is available for comparison. Normal left ventricular systolic function. No evidence of valvular abnormality based on Doppler evaluation. KENDRICK ATWOOD Exam Date:         2019                    12:11 Exam Location:     Inpatient Priority:          Routine Ordering Physician:        ARON LAL Referring Physician:       [id number] Estrella Sonographer:               Belkis Marquez Age:    53     Gender:    M MRN:    [id number] :    1965 BSA:    2.21   Ht (in):    74     Wt (lb):    208 Exam Type:     Complete Indications:     Endocarditis ICD Codes:       421 CPT Codes:       63685 BP:   106    /   74     HR: Technical Quality:       Fair MEASUREMENTS  (Male / Female) Normal Values 2D ECHO LV Diastolic Diameter PLAX        4.3 cm                4.2 - 5.9 / 3.9 -  5.3 cm LV Systolic Diameter PLAX         3.7 cm                2.1 - 4.0 cm IVS Diastolic Thickness           1.3 cm                LVPW Diastolic Thickness          1.1 cm                LVOT Diameter                     2.9 cm                Estimated LV Ejection Fraction    55 %                  LV Ejection Fraction MOD 4C       48.7 %                LV Ejection Fraction 4C AL        49.7 %                DOPPLER Mitral E Point Velocity           0.3 m/s               Mitral E to A Ratio               0.65                  MV Deceleration Time              153 ms                PV Peak Velocity                  0.84 m/s              PV Peak Gradient                  2.8 mmHg              RVOT Peak Velocity                0.49 m/s              LV E' Lateral Velocity            13.6 cm/s             Mitral E to LV E' Lateral Ratio   2.2                   LV E' Septal Velocity             20.9 cm/s             Mitral E to LV E' Septal Ratio    1.4                   * Indicates values subject to auto-interpretation LV EF:  55    % FINDINGS Left Ventricle Normal left ventricular chamber size. Mild concentric left ventricular hypertrophy. Left ventricular systolic function is normal. Left ventricular ejection fraction is visually estimated to be 55%. Right Ventricle Normal right ventricular size and systolic function. Right Atrium Normal right atrial size. Left Atrium Left atrium not well visualized. Mitral Valve Structurally normal mitral valve. No mitral stenosis. Trace mitral regurgitation. Aortic Valve Structurally normal aortic valve. Tricuspid Valve Structurally normal tricuspid valve. No stenosis or regurgitation seen. Unable to estimate pulmonary artery pressure due to an inadequate tricuspid regurgitant jet. Pulmonic Valve The pulmonic valve is not well visualized. No stenosis or regurgitation seen. Pericardium Normal pericardium without effusion. Aorta Ascending aorta not well visualized. [facility] To  (Electronically Signed) Final Date:     2019                 16:52    Us-extremity Artery Lower Unilat W/steve (combo) Left    Result Date: 2019   Vascular Laboratory  Conclusions  KENDRICK ATWOOD  Age:    53    Gender:     M  MRN:    [id number]  :    1965      BSA:  Exam Date:     2019 08:10  Room #:     Inpatient  Priority:     Routine  Ht (in):             Wt (lb):  Ordering Physician:        VALERIE AVALOS  Referring Physician:       [id number] Underwood  Sonographer:               Morales Suggs RVT, RDMS  Study Type:                Complete Bilateral  Technical Quality:         Adequate  Indications:     Atherosclerosis of native arteries of left leg with                   ulceration of unspecified site  CPT Codes:       82128  ICD Codes:       I70.249  History:         Nonhealing wound of left lower extremity  Limitations:     Unable to obtain right brachial pressure due to line in arm,                    Unable to obtain left popliteal waveform due to wound                   dressing                 RIGHT  Waveform            Systolic BPs (mmHg)                                           Brachial  Triphasic                                Common Femoral  Triphasic                  135           Posterior Tibial  Triphasic                  137           Dorsalis Pedis                                           Peroneal                             1.18          STEVE                                           TBI                       LEFT  Waveform        Systolic BPs (mmHg)                             116           Brachial  Triphasic                                Common Femoral  Triphasic                  132           Posterior Tibial  Triphasic                  134           Dorsalis Pedis                                           Peroneal                             1.16          STEVE                                           TBI  Findings  Bilateral.   "Doppler waveform of the common femoral artery is of high amplitude and  triphasic.  Doppler waveforms at the ankle are brisk and triphasic.  Ankle-brachial index is normal.  Additional testing was not performed in accordance with lower extremity  arterial evaluation protocol.       Micro:  Results     Procedure Component Value Units Date/Time    BLOOD CULTURE [[id number]]  (Abnormal)  (Susceptibility) Collected:  12/15/19 1130    Order Status:  Completed Specimen:  Blood from Peripheral Updated:  12/19/19 1022     Significant Indicator POS     Source BLD     Site PERIPHERAL     Culture Result Growth detected by Bactec instrument. 12/16/2019  10:18  Staphylococcus aureus (methicillin sensitive)  detected by PCR.        Staphylococcus aureus    Narrative:       CALL  Dsouza  MS5 tel. [phone],  CALLED  MS5 tel. [phone] 12/16/2019, 10:24, called [phone] Dionne  2 of 2 blood culture x2  Sites order. Per Hospital Policy:  Only change Specimen Src: to \"Line\" if specified by physician  order.  No site indicated    Susceptibility     Staphylococcus aureus (1)     Antibiotic Interpretation Microscan Method Status    Clindamycin Sensitive <=0.5 mcg/mL ANTONIO Final    Daptomycin Sensitive <=0.5 mcg/mL ANTONIO Final    Erythromycin Sensitive <=0.5 mcg/mL ANTONIO Final    Moxifloxacin Sensitive 1 mcg/mL ANTONIO Final    Ampicillin/sulbactam Sensitive <=8/4 mcg/mL ANTONIO Final    Oxacillin Sensitive <=0.25 mcg/mL ANTONIO Final    Vancomycin Sensitive 1 mcg/mL ANTONIO Final    Penicillin Resistant 2 mcg/mL ANTONIO Final    Trimeth/Sulfa Sensitive <=0.5/9.5 mcg/mL ANTONIO Final    Tetracycline Sensitive <=4 mcg/mL ANTONIO Final                   GRAM STAIN [[id number]] Collected:  12/18/19 1219    Order Status:  Completed Specimen:  Wound Updated:  12/18/19 1409     Significant Indicator .     Source WND     Site Left thigh wound     Gram Stain Result Few WBCs.  No organisms seen.      Narrative:       Surgery - swabs received    CULTURE WOUND W/ GRAM STAIN [[id number]] " "Collected:  12/18/19 1219    Order Status:  Completed Specimen:  Other Updated:  12/18/19 1319    Anaerobic Culture [[id number]] Collected:  12/18/19 1219    Order Status:  Completed Specimen:  Other Updated:  12/18/19 1319    BLOOD CULTURE [[id number]] Collected:  12/17/19 0327    Order Status:  Completed Specimen:  Blood from Peripheral Updated:  12/18/19 0729     Significant Indicator NEG     Source BLD     Site PERIPHERAL     Culture Result No Growth  Note: Blood cultures are incubated for 5 days and  are monitored continuously.Positive blood cultures  are called to the RN and reported as soon as  they are identified.      Narrative:       Per Hospital Policy: Only change Specimen Src: to \"Line\" if  specified by physician order.  Left AC    BLOOD CULTURE [[id number]] Collected:  12/17/19 0332    Order Status:  Completed Specimen:  Blood from Peripheral Updated:  12/18/19 0729     Significant Indicator NEG     Source BLD     Site PERIPHERAL     Culture Result No Growth  Note: Blood cultures are incubated for 5 days and  are monitored continuously.Positive blood cultures  are called to the RN and reported as soon as  they are identified.      Narrative:       Per Hospital Policy: Only change Specimen Src: to \"Line\" if  specified by physician order.  Right Forearm/Arm    Culture Wound W/ Gram Stain [[id number]]  (Abnormal)  (Susceptibility) Collected:  12/15/19 1115    Order Status:  Completed Specimen:  Wound from Left Leg Updated:  12/17/19 0859     Significant Indicator POS     Source WND     Site LEFT LEG     Culture Result -     Gram Stain Result Moderate WBCs.  Many Gram positive cocci.       Culture Result Beta Hemolytic Streptococcus group A  Heavy growth        Staphylococcus aureus  Moderate growth      Narrative:       CALL  Dsouza  MS5 tel. [phone],  CALLED  MS5 tel. [phone] 12/16/2019, 15:13, RB PERF. RESULTS CALLED  TO:Maryann [id number] Rn    Susceptibility     Staphylococcus aureus (2)     Antibiotic " "Interpretation Microscan Method Status    Clindamycin Sensitive <=0.5 mcg/mL ANTONIO Final    Daptomycin Sensitive <=0.5 mcg/mL ANTONIO Final    Erythromycin Sensitive <=0.5 mcg/mL ANTONIO Final    Moxifloxacin Sensitive 1 mcg/mL ANTONIO Final    Ampicillin/sulbactam Sensitive <=8/4 mcg/mL ANTONIO Final    Oxacillin Sensitive <=0.25 mcg/mL ANTONIO Final    Vancomycin Sensitive 1 mcg/mL ANTONIO Final    Penicillin Resistant 2 mcg/mL ANTONIO Final    Trimeth/Sulfa Sensitive <=0.5/9.5 mcg/mL ANTONIO Final    Tetracycline Sensitive <=4 mcg/mL ANTONIO Final                   BLOOD CULTURE [[id number]] Collected:  12/15/19 1151    Order Status:  Completed Specimen:  Blood from Peripheral Updated:  12/16/19 0946     Significant Indicator NEG     Source BLD     Site PERIPHERAL     Culture Result No Growth  Note: Blood cultures are incubated for 5 days and  are monitored continuously.Positive blood cultures  are called to the RN and reported as soon as  they are identified.      Narrative:       1 of 2 for Blood Culture x 2 sites order. Per Hospital  Policy: Only change Specimen Src: to \"Line\" if specified by  physician order.  Left Forearm/Arm    GRAM STAIN [[id number]] Collected:  12/15/19 1115    Order Status:  Completed Specimen:  Wound Updated:  12/15/19 2120     Significant Indicator .     Source WND     Site LEFT LEG     Gram Stain Result Moderate WBCs.  Many Gram positive cocci.            Assessment:  Active Hospital Problems    Diagnosis   • *Wound infection, left inner thigh [T14.8XXA, L08.9]   • Bacteremia due to methicillin resistant Staphylococcus aureus [R78.81]   • LLE Cellulitis [L03.90]   • Heroin abuse ([facility]) [F11.10]     Interval 24 hour assessment:      AF, O2 RA  Labs reviewed  Imaging personally reviewed both images and report  Micro reviewed    Pt continued on cefazolin.  He is doing well postop with no significant discomfort.  He is denying any new headache, neck back or joint pain.  He states BAILEY is planned for " tomorrow.          ASSESSMENT/PLAN:      Jaime Gunderson is a 53 y.o.  admitted 12/15/2019. Pt has a past medical history of injury to left lower leg with chronic nonhealing wound as well as active IV drug use with heroin.  He has a history of injecting into veins of right arm as well as left leg.  He states he had a recent abscess on his right arm which he cut open himself and drained and it is healed.  He then had a similar appearance on his left thigh with edema, erythema and drainage at injection site.  He then used a razor blade and cut open the wound and attempt to drain it.  However, the left thigh had increasing erythema pain and drainage.  His symptoms been ongoing for approximately 7 days and due to worsening wound on his left leg he presented to the ER.     Hospital Course:   The patient is been afebrile and no significant leukocytosis.  Blood cultures on 12/15 1/2+ for MSSA.  See below for details.  He was admitted and started on vancomycin and Unasyn.  Now transitioned to cefazolin.      Leukocytosis, new, likely secondary to surgery yesterday  MSSA bacteremia, IV drug use  - Blood cultures on 12/15 2/2+ for MSSA. Per PCR  - Blood cultures on 12/17 2/2- NGTD  -TTE on 12/18 with no vegetation  -BAILEY requested and pending     Left thigh abscess  Left lower leg abscess   -2/2 IV drug use  -CT left leg on 12/15 with edema, no osteomyelitis, no abscess  -Wound cultures on 12/15 with group A strep and MSSA     IV drug use, active, heroin  -HIV screen negative on 12/16  -Acute hepatitis panel negative on 12/15 with exception of positive hepatitis C antibody     Injury to left lower leg with chronic nonhealing wounds which have worsened recently     Injury to lumbar spine, chronic back pain   -MRI lumbar spine with contrast on 12/16 with no evidence of discitis/osteomyelitis or abnormal fluid collection.  -OR on 12/18 for I&D of left thigh abscess, cultures obtained    Chronic hepatitis C infection  -  Known hepatitis C antibody positive, HCV quant 6,167,985 on 12/18      --- Continue cefazolin-antibiotic duration to be determined by BAILEY, if negative will recommend 4 weeks from first negative blood culture and if positive will recommend 6-week course  --- F/up repeat blood cultures  --- Follow-up cultures left thigh abscess  --- Awaiting BAILEY, appreciate cardiology assistance   --- Wound care for left leg  --- Okay to place PICC line once blood cultures are negative for 72 hours  --- Recommend follow-up as outpatient for treatment for his hepatitis C           Plan of care discussed with FRANCISCO Caba & Dr. Malcolm. Will continue to follow.

## 2019-12-19 NOTE — DISCHARGE PLANNING
Agency/Facility Name: [facility]  Spoke To: Brandy  Outcome: Accepted and have auth. No beds today.  SW informed.

## 2019-12-19 NOTE — PROGRESS NOTES
Hospital Medicine Daily Progress Note    Date of Service  12/19/2019    Chief Complaint  Wound check    Hospital Course   Mr. Gunderson is a 54 y/o male who presented to the ED on 12/15/19 with a possible abscess to his right forearm in addition to 2 large open wounds on the inside of his left thigh with a large amount of purulent drainage where he has been injecting heroin. He was told he would need medical clearance prior to undergoing rehab through [facility]. A CT scan was done and showed extensive soft tissue induration and/or edema throughout the subcutaneous fat and intramuscular fat planes of the left lower extremity which could be due to cellulitis or edema. There was no appreciable abscess or evidence of osteomyelitis. Wound culture was positive for group A strep and MSSA, and his blood cultures were also positive for MSSA in 1 out of 2 bottles. An echocardiogram, wound consult, and lumbar spine MRI were subsequently ordered. The MRI & TTE were negative. Cardiology was then consulted on 12/18/19 for BAILEY. An US of the LE was also done and had no evidence of arterial insufficiency. The wound care RN also recommended a surgical consult for possible I&D, which was performed on 12/18/19 by Dr. Ward.         Interval Problem Update  POD #1 from I&D. Intraoperative cultures pending.  Pain is controlled.  No withdrawal symptoms today now that he is on twice daily methadone.      12/18: Cardiology to do BAILEY ASAP, at request of ID.  Now tentatively planned for tomorrow.    WBC elevated today, likely from surgical procedure yesterday. Remaining CBC & BMP unremarkable.   Repeat BC drawn 12/17/19 are still in process.     Afebrile overnight, HR 70s, SBP 100s-teens, O2 sats WNL on room air.     Consultants/Specialty  Infectious disease  Cardiology  Orthopedic surgery    Code Status  Full code    Disposition   Clinical for now. Anticipate LTAC when medically clear.     Review of Systems  Review of  Systems   Constitutional: Negative for chills, diaphoresis, fever and malaise/fatigue.   Respiratory: Negative for cough, shortness of breath and wheezing.    Cardiovascular: Negative for chest pain, palpitations and leg swelling.   Gastrointestinal: Negative for abdominal pain, constipation, diarrhea, nausea and vomiting.   Genitourinary: Negative for dysuria, frequency and urgency.   Musculoskeletal: Negative for back pain, joint pain and myalgias.   Skin:        + multiple wounds throughout his body (left anterior shin, right forearm, left upper medial thigh, left lower anterior medial shin); purulent drainage noted from inner thigh wounds   Neurological: Negative for dizziness, sensory change, speech change, focal weakness, seizures, weakness and headaches.   Psychiatric/Behavioral: Positive for substance abuse (IV heroin). Negative for depression and hallucinations. The patient is not nervous/anxious and does not have insomnia.    All other systems reviewed and are negative.     Physical Exam  Temp:  [35.3 °C (95.6 °F)-36.7 °C (98 °F)] 36.7 °C (98 °F)  Pulse:  [60-80] 68  Resp:  [12-20] 17  BP: ()/(51-87) 114/76  SpO2:  [90 %-98 %] 97 %    Physical Exam  Vitals signs and nursing note reviewed.   Constitutional:       General: He is awake. He is not in acute distress.     Appearance: Normal appearance. He is well-developed and overweight. He is not ill-appearing.   HENT:      Head: Normocephalic and atraumatic.      Mouth/Throat:      Lips: Pink.      Mouth: Mucous membranes are moist.   Eyes:      Conjunctiva/sclera: Conjunctivae normal.      Pupils: Pupils are equal, round, and reactive to light.   Neck:      Musculoskeletal: Normal range of motion and neck supple.      Vascular: No JVD.   Cardiovascular:      Rate and Rhythm: Normal rate and regular rhythm.      Pulses: Normal pulses.      Heart sounds: Normal heart sounds.   Pulmonary:      Effort: Pulmonary effort is normal.      Breath sounds: Normal  breath sounds.   Abdominal:      General: Bowel sounds are normal. There is no distension or abdominal bruit.      Palpations: Abdomen is soft.      Tenderness: There is no tenderness.   Musculoskeletal:      Lumbar back: He exhibits no tenderness, no bony tenderness and no pain.      Right lower leg: No edema.      Left lower leg: No edema.   Skin:     General: Skin is warm and dry.      Findings: Abscess, erythema, signs of injury and wound present.             Comments: Left leg is now covered with a surgical dressing, which reportedly is scheduled to be removed in the next 1-2 days per orthopedic surgery.  Unable to visualize wounds at this time.   Neurological:      General: No focal deficit present.      Mental Status: He is alert and oriented to person, place, and time.      GCS: GCS eye subscore is 4. GCS verbal subscore is 5. GCS motor subscore is 6.      Cranial Nerves: Cranial nerves are intact.      Sensory: Sensation is intact.      Motor: Motor function is intact.      Coordination: Coordination is intact.      Gait: Gait is intact.   Psychiatric:         Attention and Perception: Attention and perception normal.         Mood and Affect: Mood and affect normal.         Speech: Speech normal.         Behavior: Behavior normal. Behavior is cooperative.         Thought Content: Thought content normal.         Cognition and Memory: Cognition and memory normal.         Judgment: Judgment normal.     Fluids    Intake/Output Summary (Last 24 hours) at 12/19/2019 1121  Last data filed at 12/19/2019 0647  Gross per 24 hour   Intake 1300 ml   Output 2220 ml   Net -920 ml     Laboratory  Recent Labs     12/17/19 0327 12/19/19  0321   WBC 6.4 16.6*   RBC 5.10 5.12   HEMOGLOBIN 15.1 15.0   HEMATOCRIT 45.5 45.7   MCV 89.2 89.3   MCH 29.6 29.3   MCHC 33.2* 32.8*   RDW 43.5 43.5   PLATELETCT 317 381   MPV 10.0 9.9     Recent Labs     12/17/19  0327 12/19/19  0321   SODIUM 140 135   POTASSIUM 4.8 4.7   CHLORIDE 104  103   CO2 27 21   GLUCOSE 109* 128*   BUN 14 21   CREATININE 0.72 0.75   CALCIUM 9.1 9.1     Imaging  EC-ECHOCARDIOGRAM COMPLETE W/O CONT   Final Result      MR-LUMBAR SPINE-WITH & W/O   Final Result      1.  No evidence of discitis/osteomyelitis or abnormal fluid collection.   2.  Congenital/developmental narrowing of the lumbar spinal canal.   3.  L2-S1 degenerative changes as detailed above.      US-DIANE SINGLE LEVEL BILAT   Final Result      US-EXTREMITY ARTERY LOWER UNILAT W/DIANE (COMBO) LEFT         CT-EXTREMITY, LOWER WITH LEFT   Final Result      1.  Extensive soft tissue induration and/or edema is identified throughout the subcutaneous fat and intramuscular fat planes of the left lower extremity. Findings could be due to cellulitis or edema.      2.  No soft tissue abscess is appreciated.      3.  No bone erosion is identified that would suggest osteomyelitis.      4.  Prominent dilated superficial veins are identified likely related to congestion inflammation or infection.      5.  Enlarged left inguinal lymph node is identified likely due to inflammation or infection.      EC-BAILEY W/O CONT    (Results Pending)      Assessment/Plan  * Wound infection, left inner thigh- (present on admission)  Assessment & Plan  -CT scan negative for abscess or osteomyelitis.   -S/p surgical I&D by Dr. Ward on 12/18/2018.  Intraoperative cultures pending. No further surgical plans per orthopedic surgery.  -ID following.  Continue antibiotics per their recommendations.  -Wound culture + MSSA & group A strep. BC also + for MSSA.   -Continue wound care.     Bacteremia due to methicillin resistant Staphylococcus aureus- (present on admission)  Assessment & Plan  -ID following.  -TTE negative.  BAILEY tentatively scheduled for tomorrow.  -MRI lumbar spine with contrast ordered due to L-spine pain & point tenderness, was negative for abscess, diskitis, osteo.   -Abx per ID. Currently on IV ancef.   -Repeat blood cultures drawn  12/17/2019 have no growth to date.    LLE Cellulitis- (present on admission)  Assessment & Plan  -CT scan negative for abscess or osteomyelitis.   -ID following. Continue abx per their recs.   -Clinically improving. Continue to monitor progression.     Heroin abuse (HCC)- (present on admission)  Assessment & Plan  -Continue to reiterate the importance of cessation.   -Withdrawal symptoms controlled on twice daily methadone.  Continue to reassess.     VTE prophylaxis: Lovenox      Electronically signed by:  Tona Caba, MSN, RN, APRN, ACNPC-AG, CCRN  Nurse Practitioner, [facility]  Work # [phone]  Cell # [phone] (call, text, or tiger text)    12/19/2019    11:21 AM

## 2019-12-20 ENCOUNTER — APPOINTMENT (OUTPATIENT)
Dept: RADIOLOGY | Facility: MEDICAL CENTER | Age: 54
DRG: 580 | End: 2019-12-20
Attending: NURSE PRACTITIONER
Payer: MEDICAID

## 2019-12-20 PROBLEM — B18.2 CHRONIC HEPATITIS C VIRUS INFECTION (HCC): Status: ACTIVE | Noted: 2019-12-20

## 2019-12-20 LAB
ANION GAP SERPL CALC-SCNC: 10 MMOL/L (ref 0–11.9)
BACTERIA BLD CULT: NORMAL
BASOPHILS # BLD AUTO: 0.8 % (ref 0–1.8)
BASOPHILS # BLD: 0.12 K/UL (ref 0–0.12)
BUN SERPL-MCNC: 23 MG/DL (ref 8–22)
CALCIUM SERPL-MCNC: 9.4 MG/DL (ref 8.5–10.5)
CHLORIDE SERPL-SCNC: 109 MMOL/L (ref 96–112)
CO2 SERPL-SCNC: 25 MMOL/L (ref 20–33)
CREAT SERPL-MCNC: 0.95 MG/DL (ref 0.5–1.4)
EKG IMPRESSION: NORMAL
EOSINOPHIL # BLD AUTO: 0.18 K/UL (ref 0–0.51)
EOSINOPHIL NFR BLD: 1.1 % (ref 0–6.9)
ERYTHROCYTE [DISTWIDTH] IN BLOOD BY AUTOMATED COUNT: 45.2 FL (ref 35.9–50)
GLUCOSE SERPL-MCNC: 114 MG/DL (ref 65–99)
HCT VFR BLD AUTO: 43.9 % (ref 42–52)
HGB BLD-MCNC: 14.2 G/DL (ref 14–18)
IMM GRANULOCYTES # BLD AUTO: 0.49 K/UL (ref 0–0.11)
IMM GRANULOCYTES NFR BLD AUTO: 3.1 % (ref 0–0.9)
LYMPHOCYTES # BLD AUTO: 3.04 K/UL (ref 1–4.8)
LYMPHOCYTES NFR BLD: 19.3 % (ref 22–41)
MCH RBC QN AUTO: 29.4 PG (ref 27–33)
MCHC RBC AUTO-ENTMCNC: 32.3 G/DL (ref 33.7–35.3)
MCV RBC AUTO: 90.9 FL (ref 81.4–97.8)
MONOCYTES # BLD AUTO: 0.9 K/UL (ref 0–0.85)
MONOCYTES NFR BLD AUTO: 5.7 % (ref 0–13.4)
NEUTROPHILS # BLD AUTO: 11.01 K/UL (ref 1.82–7.42)
NEUTROPHILS NFR BLD: 70 % (ref 44–72)
NRBC # BLD AUTO: 0 K/UL
NRBC BLD-RTO: 0 /100 WBC
PLATELET # BLD AUTO: 324 K/UL (ref 164–446)
PMV BLD AUTO: 9.7 FL (ref 9–12.9)
POTASSIUM SERPL-SCNC: 5.1 MMOL/L (ref 3.6–5.5)
POTASSIUM SERPL-SCNC: 5.9 MMOL/L (ref 3.6–5.5)
RBC # BLD AUTO: 4.83 M/UL (ref 4.7–6.1)
SIGNIFICANT IND 70042: NORMAL
SITE SITE: NORMAL
SODIUM SERPL-SCNC: 144 MMOL/L (ref 135–145)
SOURCE SOURCE: NORMAL
WBC # BLD AUTO: 15.7 K/UL (ref 4.8–10.8)

## 2019-12-20 PROCEDURE — 99232 SBSQ HOSP IP/OBS MODERATE 35: CPT | Performed by: INTERNAL MEDICINE

## 2019-12-20 PROCEDURE — 02HV33Z INSERTION OF INFUSION DEVICE INTO SUPERIOR VENA CAVA, PERCUTANEOUS APPROACH: ICD-10-PCS | Performed by: INTERNAL MEDICINE

## 2019-12-20 PROCEDURE — 80048 BASIC METABOLIC PNL TOTAL CA: CPT

## 2019-12-20 PROCEDURE — 700102 HCHG RX REV CODE 250 W/ 637 OVERRIDE(OP): Performed by: NURSE PRACTITIONER

## 2019-12-20 PROCEDURE — 700102 HCHG RX REV CODE 250 W/ 637 OVERRIDE(OP): Performed by: HOSPITALIST

## 2019-12-20 PROCEDURE — 93005 ELECTROCARDIOGRAM TRACING: CPT | Performed by: NURSE PRACTITIONER

## 2019-12-20 PROCEDURE — 36415 COLL VENOUS BLD VENIPUNCTURE: CPT

## 2019-12-20 PROCEDURE — 700111 HCHG RX REV CODE 636 W/ 250 OVERRIDE (IP): Performed by: HOSPITALIST

## 2019-12-20 PROCEDURE — 770006 HCHG ROOM/CARE - MED/SURG/GYN SEMI*

## 2019-12-20 PROCEDURE — A9270 NON-COVERED ITEM OR SERVICE: HCPCS | Performed by: HOSPITALIST

## 2019-12-20 PROCEDURE — A9270 NON-COVERED ITEM OR SERVICE: HCPCS | Performed by: NURSE PRACTITIONER

## 2019-12-20 PROCEDURE — 85025 COMPLETE CBC W/AUTO DIFF WBC: CPT

## 2019-12-20 PROCEDURE — 99233 SBSQ HOSP IP/OBS HIGH 50: CPT | Performed by: INTERNAL MEDICINE

## 2019-12-20 PROCEDURE — 84132 ASSAY OF SERUM POTASSIUM: CPT

## 2019-12-20 PROCEDURE — 36573 INSJ PICC RS&I 5 YR+: CPT

## 2019-12-20 PROCEDURE — 93010 ELECTROCARDIOGRAM REPORT: CPT | Performed by: INTERNAL MEDICINE

## 2019-12-20 RX ADMIN — OXYCODONE HYDROCHLORIDE 10 MG: 10 TABLET ORAL at 03:35

## 2019-12-20 RX ADMIN — OXYCODONE HYDROCHLORIDE 5 MG: 5 TABLET ORAL at 10:34

## 2019-12-20 RX ADMIN — CEFAZOLIN SODIUM 2 G: 2 INJECTION, SOLUTION INTRAVENOUS at 05:14

## 2019-12-20 RX ADMIN — OXYCODONE HYDROCHLORIDE 5 MG: 5 TABLET ORAL at 15:06

## 2019-12-20 RX ADMIN — ENOXAPARIN SODIUM 40 MG: 100 INJECTION SUBCUTANEOUS at 05:14

## 2019-12-20 RX ADMIN — METHADONE HYDROCHLORIDE 10 MG: 10 TABLET ORAL at 17:21

## 2019-12-20 RX ADMIN — SENNOSIDES AND DOCUSATE SODIUM 2 TABLET: 8.6; 5 TABLET ORAL at 05:14

## 2019-12-20 RX ADMIN — SENNOSIDES AND DOCUSATE SODIUM 2 TABLET: 8.6; 5 TABLET ORAL at 17:21

## 2019-12-20 RX ADMIN — CEFAZOLIN SODIUM 2 G: 2 INJECTION, SOLUTION INTRAVENOUS at 22:17

## 2019-12-20 RX ADMIN — METHADONE HYDROCHLORIDE 10 MG: 10 TABLET ORAL at 05:14

## 2019-12-20 RX ADMIN — CEFAZOLIN SODIUM 2 G: 2 INJECTION, SOLUTION INTRAVENOUS at 13:24

## 2019-12-20 RX ADMIN — OXYCODONE HYDROCHLORIDE 10 MG: 10 TABLET ORAL at 22:16

## 2019-12-20 ASSESSMENT — ENCOUNTER SYMPTOMS
BACK PAIN: 0
HALLUCINATIONS: 0
CONSTIPATION: 0
NAUSEA: 0
ABDOMINAL PAIN: 0
TREMORS: 0
DEPRESSION: 0
FOCAL WEAKNESS: 0
SEIZURES: 0
DIZZINESS: 0
FEVER: 0
CHILLS: 0
INSOMNIA: 0
WHEEZING: 0
NERVOUS/ANXIOUS: 0
COUGH: 0
PALPITATIONS: 0
SORE THROAT: 0
MYALGIAS: 0
DIAPHORESIS: 0
SPEECH CHANGE: 0
PND: 0
HEADACHES: 0
DIARRHEA: 0
NECK PAIN: 0
SINUS PAIN: 0
ORTHOPNEA: 0
SHORTNESS OF BREATH: 0
WEAKNESS: 0
VOMITING: 0
SENSORY CHANGE: 0

## 2019-12-20 ASSESSMENT — LIFESTYLE VARIABLES: SUBSTANCE_ABUSE: 1

## 2019-12-20 NOTE — CARE PLAN
Problem: Communication  Goal: The ability to communicate needs accurately and effectively will improve  Outcome: PROGRESSING AS EXPECTED     Problem: Safety  Goal: Will remain free from falls  Outcome: PROGRESSING AS EXPECTED     Problem: Infection  Goal: Will remain free from infection  Outcome: PROGRESSING AS EXPECTED  Note:   IV abx administered as ordered. PICC placed for long term ABX     Problem: Pain Management  Goal: Pain level will decrease to patient's comfort goal  Outcome: PROGRESSING AS EXPECTED  Note:   Pain managed with PRN oxycodone

## 2019-12-20 NOTE — PROGRESS NOTES
POD 2 I&D thigh abscess   No future surgery planned  Daily dry dressings  Abx per ID  WBAT  Suture removal 2 weeks

## 2019-12-20 NOTE — PROGRESS NOTES
Assumed care of patient at 0700. Report received from night RN. A&Ox4 and VSS on RA. Ambulating without any issues. Dressing clean dry and intact. C/o moderate pain- oxy given 1x. PICC placed today. Able to make needs known, call bell within reach. Will continue to monitor.

## 2019-12-20 NOTE — PROGRESS NOTES
Hospital Medicine Daily Progress Note    Date of Service  12/20/2019    Chief Complaint  Wound check    Hospital Course   Mr. Gunderson is a 54 y/o male who presented to the ED on 12/15/19 with a possible abscess to his right forearm in addition to 2 large open wounds on the inside of his left thigh with a large amount of purulent drainage where he has been injecting heroin. He was told he would need medical clearance prior to undergoing rehab through [facility]. A CT scan was done and showed extensive soft tissue induration and/or edema throughout the subcutaneous fat and intramuscular fat planes of the left lower extremity which could be due to cellulitis or edema. There was no appreciable abscess or evidence of osteomyelitis. Wound culture was positive for group A strep and MSSA, and his blood cultures were also positive for MSSA in 1 out of 2 bottles. An echocardiogram, wound consult, and lumbar spine MRI were subsequently ordered. The MRI & TTE were negative. Cardiology was then consulted on 12/18/19 for BAILEY. An US of the LE was also done and had no evidence of arterial insufficiency. The wound care RN also recommended a surgical consult for possible I&D, which was performed on 12/18/19 by Dr. Ward.         Interval Problem Update  Feeling good. No s/s of withdrawal. Pain is controlled.     POD #2 from I&D. Intraoperative cultures pending but are preliminarily negative.      12/17/19 BC still have NGTD, now > 72h. PICC line ordered.      Cardiology to do BAILEY today?? Antibiotic duration hinging on BAILEY results.     WBC trending down. CBC otherwise unremarkable. Potassium 5.9, level rechecked & was 5.1. No EKG changes.     Afebrile overnight, HR 50s, SBP 100s-teens, O2 sats WNL on room air.     Consultants/Specialty  Infectious disease  Cardiology  Orthopedic surgery    Code Status  Full code    Disposition   Anticipate LTAC when medically ready, possibly this weekend.   Will need suture  removal in 2 weeks.     Review of Systems  Review of Systems   Constitutional: Negative for chills, diaphoresis, fever and malaise/fatigue.   HENT: Negative for congestion, sinus pain and sore throat.    Respiratory: Negative for cough, shortness of breath and wheezing.    Cardiovascular: Negative for chest pain, palpitations, orthopnea, leg swelling and PND.   Gastrointestinal: Negative for abdominal pain, constipation, diarrhea, nausea and vomiting.   Genitourinary: Negative for dysuria, frequency and urgency.   Musculoskeletal: Negative for back pain, joint pain, myalgias and neck pain.   Skin:        + multiple wounds throughout his body (left anterior shin, right forearm, left upper medial thigh, left lower anterior medial shin); purulent drainage noted from inner thigh wounds  Surgical wrap now in place   Neurological: Negative for dizziness, tremors, sensory change, speech change, focal weakness, seizures, weakness and headaches.   Psychiatric/Behavioral: Positive for substance abuse (IV heroin). Negative for depression and hallucinations. The patient is not nervous/anxious and does not have insomnia.    All other systems reviewed and are negative.     Physical Exam  Temp:  [36.1 °C (97 °F)-36.4 °C (97.6 °F)] 36.2 °C (97.2 °F)  Pulse:  [52-70] 58  Resp:  [16-18] 18  BP: (104-149)/(67-73) 115/73  SpO2:  [94 %-97 %] 97 %    Physical Exam  Vitals signs and nursing note reviewed.   Constitutional:       General: He is awake. He is not in acute distress.     Appearance: Normal appearance. He is well-developed and overweight. He is not ill-appearing.   HENT:      Head: Normocephalic and atraumatic.      Mouth/Throat:      Lips: Pink.      Mouth: Mucous membranes are moist.   Eyes:      Conjunctiva/sclera: Conjunctivae normal.      Pupils: Pupils are equal, round, and reactive to light.   Neck:      Musculoskeletal: Normal range of motion and neck supple.      Vascular: No JVD.   Cardiovascular:      Rate and  Rhythm: Normal rate and regular rhythm.      Pulses: Normal pulses.      Heart sounds: Normal heart sounds.   Pulmonary:      Effort: Pulmonary effort is normal.      Breath sounds: Normal breath sounds.   Abdominal:      General: Bowel sounds are normal. There is no distension.      Palpations: Abdomen is soft.      Tenderness: There is no tenderness.   Musculoskeletal:      Lumbar back: He exhibits no tenderness, no bony tenderness and no pain.      Right lower leg: No edema.      Left lower leg: No edema.   Skin:     General: Skin is warm and dry.      Findings: Abscess, erythema, signs of injury and wound present.             Comments: Left leg is now covered with a surgical dressing, which reportedly is scheduled to be removed in the next 1-2 days per orthopedic surgery.  Unable to visualize wounds at this time.   Neurological:      General: No focal deficit present.      Mental Status: He is alert and oriented to person, place, and time.      GCS: GCS eye subscore is 4. GCS verbal subscore is 5. GCS motor subscore is 6.      Cranial Nerves: Cranial nerves are intact.      Sensory: Sensation is intact.      Motor: Motor function is intact.      Coordination: Coordination is intact.      Gait: Gait is intact.   Psychiatric:         Attention and Perception: Attention and perception normal.         Mood and Affect: Mood and affect normal.         Speech: Speech normal.         Behavior: Behavior normal. Behavior is cooperative.         Thought Content: Thought content normal.         Cognition and Memory: Cognition and memory normal.         Judgment: Judgment normal.      Comments: Pleasant & cooperative.      Fluids    Intake/Output Summary (Last 24 hours) at 12/20/2019 1328  Last data filed at 12/20/2019 1000  Gross per 24 hour   Intake 920 ml   Output 575 ml   Net 345 ml     Laboratory  Recent Labs     12/19/19  0321 12/20/19  0258   WBC 16.6* 15.7*   RBC 5.12 4.83   HEMOGLOBIN 15.0 14.2   HEMATOCRIT 45.7  43.9   MCV 89.3 90.9   MCH 29.3 29.4   MCHC 32.8* 32.3*   RDW 43.5 45.2   PLATELETCT 381 324   MPV 9.9 9.7     Recent Labs     12/19/19  0321 12/20/19  0258 12/20/19  1034   SODIUM 135 144  --    POTASSIUM 4.7 5.9* 5.1   CHLORIDE 103 109  --    CO2 21 25  --    GLUCOSE 128* 114*  --    BUN 21 23*  --    CREATININE 0.75 0.95  --    CALCIUM 9.1 9.4  --      Imaging  EC-ECHOCARDIOGRAM COMPLETE W/O CONT   Final Result      MR-LUMBAR SPINE-WITH & W/O   Final Result      1.  No evidence of discitis/osteomyelitis or abnormal fluid collection.   2.  Congenital/developmental narrowing of the lumbar spinal canal.   3.  L2-S1 degenerative changes as detailed above.      US-DIANE SINGLE LEVEL BILAT   Final Result      US-EXTREMITY ARTERY LOWER UNILAT W/DIANE (COMBO) LEFT         CT-EXTREMITY, LOWER WITH LEFT   Final Result      1.  Extensive soft tissue induration and/or edema is identified throughout the subcutaneous fat and intramuscular fat planes of the left lower extremity. Findings could be due to cellulitis or edema.      2.  No soft tissue abscess is appreciated.      3.  No bone erosion is identified that would suggest osteomyelitis.      4.  Prominent dilated superficial veins are identified likely related to congestion inflammation or infection.      5.  Enlarged left inguinal lymph node is identified likely due to inflammation or infection.      EC-BAILEY W/O CONT    (Results Pending)   IR-PICC LINE PLACEMENT W/ GUIDANCE > AGE 5    (Results Pending)      Assessment/Plan  * Wound infection, left inner thigh- (present on admission)  Assessment & Plan  -CT scan negative for abscess or osteomyelitis.   -S/p surgical I&D by Dr. Ward on 12/18/2018.  Intraoperative cultures pending, prelim negative. No further surgical plans per orthopedic surgery.  -ID following.  Continue antibiotics per their recommendations.  -Wound culture + MSSA & group A strep. BC also + for MSSA.   -Continue wound care.     Bacteremia due to  methicillin resistant Staphylococcus aureus- (present on admission)  Assessment & Plan  -ID following.  -TTE negative.  BAILEY being scheduled, unclear of when it will get done (this weekend vs Monday).  -MRI lumbar spine with contrast ordered due to L-spine pain & point tenderness, was negative for abscess, diskitis, osteo.   -Abx per ID. Currently on IV ancef. Duration will be influence by BAILEY results.   -Repeat blood cultures drawn 12/17/2019 have no growth to date. PICC line to be placed today.     LLE Cellulitis- (present on admission)  Assessment & Plan  -CT scan negative for abscess or osteomyelitis.   -ID following. Continue abx per their recs.   -Clinically improving. Continue to monitor progression.     Chronic hepatitis C virus infection (HCC)- (present on admission)  Assessment & Plan  -Recommend outpatient follow up.     Heroin abuse (HCC)- (present on admission)  Assessment & Plan  -Continue to reiterate the importance of cessation.   -Withdrawal symptoms controlled on twice daily methadone.  Continue to reassess.     VTE prophylaxis: Lovenox      Electronically signed by:  Tona Caba, MSN, RN, APRN, ACNPC-AG, CCRN  Nurse Practitioner, [facility]  Work # [phone]  Cell # [phone] (call, text, or tiger text)    12/20/2019    1:28 PM

## 2019-12-20 NOTE — PROGRESS NOTES
Infectious Disease Progress Note    Author: Kelsi Hedrick M.D. Date & Time of service: 2019  10:22 AM    Chief Complaint:  MSSA bacteremia     Interval history:    AF, new left leg nodular lesion concerning for abscess.  Ongoing purulent drainage from left thigh abscess.  Patient had TTE today and awaiting results.   afebrile WBC 15.7.  Patient is eating breakfast.  He has no complaints and feels well.  No pain at the surgical site.      Review of Systems:  Review of Systems   Constitutional: Negative for chills, fever and malaise/fatigue.   Respiratory: Negative for cough and shortness of breath.    Gastrointestinal: Negative for abdominal pain, constipation, diarrhea, nausea and vomiting.   Musculoskeletal: Negative for back pain, joint pain, myalgias and neck pain.   Neurological: Negative for headaches.       Hemodynamics:  Temp (24hrs), Av.3 °C (97.3 °F), Min:36.1 °C (97 °F), Max:36.4 °C (97.6 °F)  Temperature: 36.2 °C (97.2 °F)  Pulse  Av.3  Min: 52  Max: 88   Blood Pressure: 115/73       Physical Exam:  Physical Exam  Constitutional:       Appearance: Normal appearance.   HENT:      Mouth/Throat:      Mouth: Mucous membranes are moist.      Pharynx: No oropharyngeal exudate.   Eyes:      Extraocular Movements: Extraocular movements intact.      Conjunctiva/sclera: Conjunctivae normal.      Pupils: Pupils are equal, round, and reactive to light.   Neck:      Musculoskeletal: Normal range of motion and neck supple.   Cardiovascular:      Rate and Rhythm: Normal rate and regular rhythm.      Heart sounds: Normal heart sounds.   Pulmonary:      Effort: Pulmonary effort is normal.      Breath sounds: Normal breath sounds.   Abdominal:      General: Abdomen is flat. Bowel sounds are normal.      Palpations: Abdomen is soft.   Musculoskeletal:      Comments: Left lower extremity in primary surgical bandage.  Toes are warm and wiggles   Skin:     General: Skin is warm and dry.    Neurological:      General: No focal deficit present.      Mental Status: He is alert and oriented to person, place, and time.      Cranial Nerves: No cranial nerve deficit.   Psychiatric:         Mood and Affect: Mood normal.         Behavior: Behavior normal.      Comments: Pleasant         Meds:    Current Facility-Administered Medications:   •  methadone  •  ceFAZolin  •  senna-docusate **AND** polyethylene glycol/lytes **AND** magnesium hydroxide **AND** bisacodyl  •  enoxaparin  •  acetaminophen  •  Notify provider if pain remains uncontrolled **AND** Use the numeric rating scale (NRS-11) on regular floors and Critical-Care Pain Observation Tool (CPOT) on ICUs/Trauma to assess pain **AND** Pulse Ox (Oximetry) **AND** Pharmacy Consult Request **AND** If patient difficult to arouse and/or has respiratory depression, stop any opiates that are currently infusing and call a Rapid Response. **AND** oxyCODONE immediate-release **AND** oxyCODONE immediate-release **AND** morphine injection  •  ondansetron  •  ondansetron  •  promethazine  •  promethazine  •  prochlorperazine    Labs:  Recent Labs     12/19/19  0321 12/20/19  0258   WBC 16.6* 15.7*   RBC 5.12 4.83   HEMOGLOBIN 15.0 14.2   HEMATOCRIT 45.7 43.9   MCV 89.3 90.9   MCH 29.3 29.4   RDW 43.5 45.2   PLATELETCT 381 324   MPV 9.9 9.7   NEUTSPOLYS 88.30* 70.00   LYMPHOCYTES 7.10* 19.30*   MONOCYTES 2.70 5.70   EOSINOPHILS 0.00 1.10   BASOPHILS 0.20 0.80     Recent Labs     12/19/19  0321 12/20/19  0258   SODIUM 135 144   POTASSIUM 4.7 5.9*   CHLORIDE 103 109   CO2 21 25   GLUCOSE 128* 114*   BUN 21 23*     Recent Labs     12/19/19  0321 12/20/19  0258   CREATININE 0.75 0.95       Imaging:  Ct-extremity, Lower With Left    Result Date: 12/15/2019  12/15/2019 1:25 PM HISTORY/REASON FOR EXAM:  Left leg swelling and redness. TECHNIQUE/EXAM DESCRIPTION AND NUMBER OF VIEWS:  CT scan of the LEFT lower extremity with contrast, with reconstructions. Thin helical 3 mm  sections were obtained from the distal femur through the proximal tibia/fibula. Sagittal and coronal multiplanar reconstructions were generated from the axial images. A total of 80 mL of Omnipaque 350 nonionic contrast was administered IV without complication. Up to date radiation dose reduction adjustments have been utilized to meet ALARA standards for radiation dose reduction. COMPARISON: None. FINDINGS: There is abnormally increased attenuation throughout the subcutaneous fat and intramuscular fat planes in the left lower extremity. This process is most prominent in the lower leg area below the level of the knee and present to a lesser degree in the thigh region. Prominent dilated veins are identified which are most prominent superficially and medially. No localized fluid collection that would suggest abscess is identified. No bone erosion or bone destruction is identified. Enlarged lymph node in the left inguinal area is identified with short axis diameter 1.8 cm.     1.  Extensive soft tissue induration and/or edema is identified throughout the subcutaneous fat and intramuscular fat planes of the left lower extremity. Findings could be due to cellulitis or edema. 2.  No soft tissue abscess is appreciated. 3.  No bone erosion is identified that would suggest osteomyelitis. 4.  Prominent dilated superficial veins are identified likely related to congestion inflammation or infection. 5.  Enlarged left inguinal lymph node is identified likely due to inflammation or infection.    Mr-lumbar Spine-with & W/o    Result Date: 12/17/2019 12/16/2019 9:59 PM HISTORY/REASON FOR EXAM:  MSSA infection with L-spine pain and point tenderness TECHNIQUE/EXAM DESCRIPTION: MRI of the lumbar spine without and with contrast. The study was performed on a Quik[employer] Indiaa 1.5 Carlotta MRI scanner. T1 sagittal, T2 fast spin-echo sagittal, and T2 axial images were obtained of the lumbar spine. T1 postcontrast fat-suppressed sagittal images were  obtained. 20 mL ProHance contrast was administered intravenously. COMPARISON:  None. FINDINGS: 5 lumbar type vertebral bodies are counted. Conus medullaris terminates at T12-L1 and is normal in signal. Straightening of the lumbar spine. No compression fracture. No suspicious abnormal disc or bone marrow edema or enhancement is seen to suggest discitis osteomyelitis. No epidural fluid collection is seen. There is congenital narrowing of the lumbar spinal canal. T11-L2: Normal. L2-L3: Diffuse disc bulge. Borderline central canal stenosis. Mild right foraminal narrowing. L3-L4: Mild diffuse disc bulge. No significant spinal or foraminal stenosis. L4-L5: Diffuse disc bulge, posterior annular fissure and disc protrusion. Mild facet degeneration. Mild spinal stenosis. Right lateral recess stenosis which may involve the right L5 nerve. Correlate for right L5 radiculopathy. Mild bilateral foraminal stenosis. L5-S1: Disc narrowing, diffuse disc osteophyte and posterior annular fissure. Borderline central canal stenosis. Lateral recess narrowing, left greater than right and at least moderate foraminal stenosis.     1.  No evidence of discitis/osteomyelitis or abnormal fluid collection. 2.  Congenital/developmental narrowing of the lumbar spinal canal. 3.  L2-S1 degenerative changes as detailed above.    Us-steve Single Level Bilat    Result Date: 2019   Vascular Laboratory  Conclusions  No evidence of arterial insufficiency.  KENDRICK ATWOOD  Age:    53    Gender:     M  MRN:    [id number]  :    1965      BSA:  Exam Date:     2019 08:10  Room #:     Inpatient  Priority:     Routine  Ht (in):             Wt (lb):  Ordering Physician:        VALERIE AVALOS  Referring Physician:       VALERIE AVALOS  Sonographer:               Morales Suggs RVT, RDMS  Study Type:                Complete Bilateral  Technical Quality:         Adequate  Indications:     Atherosclerosis of native arteries  of left leg with                   ulceration of unspecified site  CPT Codes:       02491  ICD Codes:       I70.249  History:         Nonhealing wound of left lower extremity  Limitations:     Unable to obtain right brachial pressure due to line in arm,                    Unable to obtain left popliteal waveform due to wound                   dressing                 RIGHT  Waveform            Systolic BPs (mmHg)                                           Brachial  Triphasic                                Common Femoral  Triphasic                  135           Posterior Tibial  Triphasic                  137           Dorsalis Pedis                                           Peroneal                             1.18          DIANE                                           TBI                       LEFT  Waveform        Systolic BPs (mmHg)                             116           Brachial  Triphasic                                Common Femoral  Triphasic                  132           Posterior Tibial  Triphasic                  134           Dorsalis Pedis                                           Peroneal                             1.16          DIANE                                           TBI  Findings  Bilateral.  Doppler waveform of the common femoral artery is of high amplitude and  triphasic.  Doppler waveforms at the ankle are brisk and triphasic.  Ankle-brachial index is normal.  Additional testing was not performed in accordance with lower extremity  arterial evaluation protocol.  Charan Cruz MD  (Electronically Signed)  Final Date:      16 December 2019                   10:58    Ec-echocardiogram Complete W/o Cont    Result Date: 12/17/2019  Transthoracic Echo Report Echocardiography Laboratory CONCLUSIONS No prior study is available for comparison. Normal left ventricular systolic function. No evidence of valvular abnormality based on Doppler evaluation. KENDRICK ATWOOD Exam Date:          2019                    12:11 Exam Location:     Inpatient Priority:          Routine Ordering Physician:        ARON LAL Referring Physician:       [id number] Estrella Sonographer:               Belkis Marquez Age:    53     Gender:    M MRN:    [id number] :    1965 BSA:    2.21   Ht (in):    74     Wt (lb):    208 Exam Type:     Complete Indications:     Endocarditis ICD Codes:       421 CPT Codes:       86866 BP:   106    /   74     HR: Technical Quality:       Fair MEASUREMENTS  (Male / Female) Normal Values 2D ECHO LV Diastolic Diameter PLAX        4.3 cm                4.2 - 5.9 / 3.9 - 5.3 cm LV Systolic Diameter PLAX         3.7 cm                2.1 - 4.0 cm IVS Diastolic Thickness           1.3 cm                LVPW Diastolic Thickness          1.1 cm                LVOT Diameter                     2.9 cm                Estimated LV Ejection Fraction    55 %                  LV Ejection Fraction MOD 4C       48.7 %                LV Ejection Fraction 4C AL        49.7 %                DOPPLER Mitral E Point Velocity           0.3 m/s               Mitral E to A Ratio               0.65                  MV Deceleration Time              153 ms                PV Peak Velocity                  0.84 m/s              PV Peak Gradient                  2.8 mmHg              RVOT Peak Velocity                0.49 m/s              LV E' Lateral Velocity            13.6 cm/s             Mitral E to LV E' Lateral Ratio   2.2                   LV E' Septal Velocity             20.9 cm/s             Mitral E to LV E' Septal Ratio    1.4                   * Indicates values subject to auto-interpretation LV EF:  55    % FINDINGS Left Ventricle Normal left ventricular chamber size. Mild concentric left ventricular hypertrophy. Left ventricular systolic function is normal. Left ventricular ejection fraction is visually estimated to be 55%. Right Ventricle Normal right ventricular size and systolic  function. Right Atrium Normal right atrial size. Left Atrium Left atrium not well visualized. Mitral Valve Structurally normal mitral valve. No mitral stenosis. Trace mitral regurgitation. Aortic Valve Structurally normal aortic valve. Tricuspid Valve Structurally normal tricuspid valve. No stenosis or regurgitation seen. Unable to estimate pulmonary artery pressure due to an inadequate tricuspid regurgitant jet. Pulmonic Valve The pulmonic valve is not well visualized. No stenosis or regurgitation seen. Pericardium Normal pericardium without effusion. Aorta Ascending aorta not well visualized. Salvatore ALBARADO To (Electronically Signed) Final Date:     2019                 16:52    Us-extremity Artery Lower Unilat W/steve (combo) Left    Result Date: 2019   Vascular Laboratory  Conclusions  KENDRICK ATWOOD  Age:    53    Gender:     M  MRN:    [id number]  :    1965      BSA:  Exam Date:     2019 08:10  Room #:     Inpatient  Priority:     Routine  Ht (in):             Wt (lb):  Ordering Physician:        VALERIE AVALOS  Referring Physician:       [id number] Underwood  Sonographer:               Morales Suggs RVT, RDMS  Study Type:                Complete Bilateral  Technical Quality:         Adequate  Indications:     Atherosclerosis of native arteries of left leg with                   ulceration of unspecified site  CPT Codes:       27619  ICD Codes:       I70.249  History:         Nonhealing wound of left lower extremity  Limitations:     Unable to obtain right brachial pressure due to line in arm,                    Unable to obtain left popliteal waveform due to wound                   dressing                 RIGHT  Waveform            Systolic BPs (mmHg)                                           Brachial  Triphasic                                Common Femoral  Triphasic                  135           Posterior Tibial  Triphasic                  137            Dorsalis Pedis                                           Peroneal                             1.18          DIANE                                           TBI                       LEFT  Waveform        Systolic BPs (mmHg)                             116           Brachial  Triphasic                                Common Femoral  Triphasic                  132           Posterior Tibial  Triphasic                  134           Dorsalis Pedis                                           Peroneal                             1.16          DIANE                                           TBI  Findings  Bilateral.  Doppler waveform of the common femoral artery is of high amplitude and  triphasic.  Doppler waveforms at the ankle are brisk and triphasic.  Ankle-brachial index is normal.  Additional testing was not performed in accordance with lower extremity  arterial evaluation protocol.       Micro:  Results     Procedure Component Value Units Date/Time    CULTURE WOUND W/ GRAM STAIN [[id number]] Collected:  12/18/19 1219    Order Status:  Completed Specimen:  Wound Updated:  12/19/19 1424     Significant Indicator NEG     Source WND     Site Left thigh wound     Culture Result No growth at 24 hours.     Gram Stain Result Few WBCs.  No organisms seen.      Narrative:       Surgery - swabs received    Anaerobic Culture [[id number]] Collected:  12/18/19 1219    Order Status:  Completed Specimen:  Wound Updated:  12/19/19 1424     Significant Indicator NEG     Source WND     Site Left thigh wound     Culture Result Culture in progress.    Narrative:       Surgery - swabs received    BLOOD CULTURE [[id number]]  (Abnormal)  (Susceptibility) Collected:  12/15/19 1130    Order Status:  Completed Specimen:  Blood from Peripheral Updated:  12/19/19 1022     Significant Indicator POS     Source BLD     Site PERIPHERAL     Culture Result Growth detected by Bactec instrument. 12/16/2019  10:18  Staphylococcus aureus (methicillin  "sensitive)  detected by PCR.        Staphylococcus aureus    Narrative:       CALL  Dsouza  MS5 tel. [phone],  CALLED  MS5 tel. [phone] 12/16/2019, 10:24, called [phone] Dionne  2 of 2 blood culture x2  Sites order. Per Hospital Policy:  Only change Specimen Src: to \"Line\" if specified by physician  order.  No site indicated    Susceptibility     Staphylococcus aureus (1)     Antibiotic Interpretation Microscan Method Status    Clindamycin Sensitive <=0.5 mcg/mL ANTONIO Final    Daptomycin Sensitive <=0.5 mcg/mL ANTONIO Final    Erythromycin Sensitive <=0.5 mcg/mL ANTONIO Final    Moxifloxacin Sensitive 1 mcg/mL ANTONIO Final    Ampicillin/sulbactam Sensitive <=8/4 mcg/mL ANTONIO Final    Oxacillin Sensitive <=0.25 mcg/mL ANTONIO Final    Vancomycin Sensitive 1 mcg/mL ANTONIO Final    Penicillin Resistant 2 mcg/mL ANTONIO Final    Trimeth/Sulfa Sensitive <=0.5/9.5 mcg/mL ANTONIO Final    Tetracycline Sensitive <=4 mcg/mL ANTONIO Final                   GRAM STAIN [[id number]] Collected:  12/18/19 1219    Order Status:  Completed Specimen:  Wound Updated:  12/18/19 1409     Significant Indicator .     Source WND     Site Left thigh wound     Gram Stain Result Few WBCs.  No organisms seen.      Narrative:       Surgery - swabs received    BLOOD CULTURE [[id number]] Collected:  12/17/19 0327    Order Status:  Completed Specimen:  Blood from Peripheral Updated:  12/18/19 0729     Significant Indicator NEG     Source BLD     Site PERIPHERAL     Culture Result No Growth  Note: Blood cultures are incubated for 5 days and  are monitored continuously.Positive blood cultures  are called to the RN and reported as soon as  they are identified.      Narrative:       Per Hospital Policy: Only change Specimen Src: to \"Line\" if  specified by physician order.  Left AC    BLOOD CULTURE [[id number]] Collected:  12/17/19 0332    Order Status:  Completed Specimen:  Blood from Peripheral Updated:  12/18/19 0729     Significant Indicator NEG     Source BLD     Site PERIPHERAL     " "Culture Result No Growth  Note: Blood cultures are incubated for 5 days and  are monitored continuously.Positive blood cultures  are called to the RN and reported as soon as  they are identified.      Narrative:       Per Hospital Policy: Only change Specimen Src: to \"Line\" if  specified by physician order.  Right Forearm/Arm    Culture Wound W/ Gram Stain [[id number]]  (Abnormal)  (Susceptibility) Collected:  12/15/19 1115    Order Status:  Completed Specimen:  Wound from Left Leg Updated:  12/17/19 0859     Significant Indicator POS     Source WND     Site LEFT LEG     Culture Result -     Gram Stain Result Moderate WBCs.  Many Gram positive cocci.       Culture Result Beta Hemolytic Streptococcus group A  Heavy growth        Staphylococcus aureus  Moderate growth      Narrative:       CALL  Dsouza  MS5 tel. [phone],  CALLED  MS5 tel. [phone] 12/16/2019, 15:13, RB PERF. RESULTS CALLED  TO:Maryann [id number] Rn    Susceptibility     Staphylococcus aureus (2)     Antibiotic Interpretation Microscan Method Status    Clindamycin Sensitive <=0.5 mcg/mL ANTONIO Final    Daptomycin Sensitive <=0.5 mcg/mL ANTONIO Final    Erythromycin Sensitive <=0.5 mcg/mL ANTONIO Final    Moxifloxacin Sensitive 1 mcg/mL ANTONIO Final    Ampicillin/sulbactam Sensitive <=8/4 mcg/mL ANTONIO Final    Oxacillin Sensitive <=0.25 mcg/mL ANTONIO Final    Vancomycin Sensitive 1 mcg/mL ANTONIO Final    Penicillin Resistant 2 mcg/mL ANTONIO Final    Trimeth/Sulfa Sensitive <=0.5/9.5 mcg/mL ANTONIO Final    Tetracycline Sensitive <=4 mcg/mL ANTONIO Final                   BLOOD CULTURE [[id number]] Collected:  12/15/19 1151    Order Status:  Completed Specimen:  Blood from Peripheral Updated:  12/16/19 0946     Significant Indicator NEG     Source BLD     Site PERIPHERAL     Culture Result No Growth  Note: Blood cultures are incubated for 5 days and  are monitored continuously.Positive blood cultures  are called to the RN and reported as soon as  they are identified.      Narrative:       1 " "of 2 for Blood Culture x 2 sites order. Per Hospital  Policy: Only change Specimen Src: to \"Line\" if specified by  physician order.  Left Forearm/Arm    GRAM STAIN [[id number]] Collected:  12/15/19 1115    Order Status:  Completed Specimen:  Wound Updated:  12/15/19 2120     Significant Indicator .     Source WND     Site LEFT LEG     Gram Stain Result Moderate WBCs.  Many Gram positive cocci.            Assessment:  Active Hospital Problems    Diagnosis   • *Wound infection, left inner thigh [T14.8XXA, L08.9]   • Bacteremia due to methicillin resistant Staphylococcus aureus [R78.81]   • LLE Cellulitis [L03.90]   • Heroin abuse (HCC) [F11.10]       ASSESSMENT/PLAN:      Jaime Gunderson is a 53 y.o.  admitted 12/15/2019. Pt has a past medical history of injury to left lower leg with chronic nonhealing wound as well as active IV drug use with heroin.  He has a history of injecting into veins of right arm as well as left leg.  He states he had a recent abscess on his right arm which he cut open himself and drained and it is healed.  He then had a similar appearance on his left thigh with edema, erythema and drainage at injection site.  He then used a razor blade and cut open the wound and attempt to drain it.  However, the left thigh had increasing erythema pain and drainage.  His symptoms been ongoing for approximately 7 days and due to worsening wound on his left leg he presented to the ER.     Hospital Course:   The patient is been afebrile and no significant leukocytosis.  Blood cultures on 12/15 1/2+ for MSSA.  See below for details.  He was admitted and started on vancomycin and Unasyn.  Now transitioned to cefazolin.      MSSA bacteremia, on treatment  Secondary to IV drug use  Leukocytosis  - Blood cultures on 12/15 2/2+ for MSSA. Per PCR  - Blood cultures on 12/17 2/2- NGTD  -TTE on 12/18 with no vegetation  -BAILEY ordered and pending - unable to be performed today per discussion with cardiology.   PICC " line ordered  Continue cefazolin 2 g every 8 hours  Pt blood cultures cleared quickly and source is 2/2 below  Will plan on 4 weeks of IV abx from 12/17.  Stop date 01/14/2020     Left thigh abscess  Left lower leg abscess   -2/2 IV drug use  -CT left leg on 12/15 with edema, no osteomyelitis, no abscess  -Wound cultures on 12/15 with group A strep and MSSA     IV drug use, active, heroin  -HIV screen negative on 12/16  -Acute hepatitis panel negative on 12/15 with exception of positive hepatitis C antibody  Not a candidate for PICC line for home or outpatient antibiotics.  Will need a monitored setting     Injury to lumbar spine, chronic back pain   -MRI lumbar spine with contrast on 12/16 with no evidence of discitis/osteomyelitis or abnormal fluid collection.  -OR on 12/18 for I&D of left thigh abscess, cultures obtained    Chronic hepatitis C infection  - Known hepatitis C antibody positive, HCV quant 6,167,985 on 12/18   Not a candidate for treatment at this time given IV drug use  Patient will eventually need to follow-up for outpatient treatment for his hepatitis C    Disposition: Will need placement given IV drug use for long-term IV antibiotic. Pt has been accepted to [facility]    Plan of care discussed with Tona THOMAS and Dr. Antony of cardiology. Will continue to follow.

## 2019-12-20 NOTE — DISCHARGE PLANNING
Anticipated Discharge Disposition: [facility]    Action: Pt discussed in IDT rounds. Pt pending BAILEY for anticipated Abx stop date. PICC line placed today. Pt will transfer to [facility] with medically clear.    Barriers to Discharge: None    Plan: LSW continue to assess for discharge needs.

## 2019-12-20 NOTE — CARE PLAN
Problem: Infection  Goal: Will remain free from infection  Outcome: PROGRESSING AS EXPECTED  Note:   IV abx administered as ordered.     Problem: Pain Management  Goal: Pain level will decrease to patient's comfort goal  Outcome: PROGRESSING AS EXPECTED  Note:   Patient c/o pain in LLE. Received PRN PO oxycodone 1x.

## 2019-12-20 NOTE — PROCEDURES
Vascular Access Team     Date of Insertion: 12/20/2019  Arm Circumference: 33  Internal length: 45  External Length: 0  Vein Occupancy %: 26   Reason for PICC: Antibiotic therapy  Labs: WBC 15.7, , BUN 23, Cr 0.95, GFR >60, INR N/A     Consents confirmed, vessel patency confirmed with ultrasound. Risks and benefits of procedure explained to patient and education regarding central line associated bloodstream infections provided. Questions answered.      PICC placed in LUE per licensed provider order with ultrasound guidance.  4 Fr, Single lumen PICC placed in Cephalic vein after 1 attempt(s). 2 mL of 1% lidocaine injected intradermally, 21 gauge microintroducer needle and modified Seldinger technique used. 45 cm catheter inserted with good blood return. Secured at 0 cm marker. Each lumen flushed without resistance with 10 mL 0.9% normal saline. PICC line secured with Biopatch and Tegaderm.     PICC tip placement location is confirmed by nurse to be in the Superior Vena Cava (SVC) utilizing 3CG technology. PICC line is appropriate for use at this time. Patient tolerated procedure well, without complications.  Patient condition relayed to unit RN or ordering physician via this post procedure note in the EMR.      Ultrasound images uploaded to PACS and viewable in the EMR - yes  Ultrasound imaged printed and placed in paper chart - no     BARD Power PICC ref # [id number], Lot # [id number]

## 2019-12-21 LAB
ANION GAP SERPL CALC-SCNC: 6 MMOL/L (ref 0–11.9)
BACTERIA SPEC ANAEROBE CULT: NORMAL
BACTERIA WND AEROBE CULT: ABNORMAL
BACTERIA WND AEROBE CULT: ABNORMAL
BASOPHILS # BLD AUTO: 0 % (ref 0–1.8)
BASOPHILS # BLD: 0 K/UL (ref 0–0.12)
BUN SERPL-MCNC: 21 MG/DL (ref 8–22)
BURR CELLS BLD QL SMEAR: NORMAL
CALCIUM SERPL-MCNC: 8.8 MG/DL (ref 8.5–10.5)
CHLORIDE SERPL-SCNC: 105 MMOL/L (ref 96–112)
CO2 SERPL-SCNC: 26 MMOL/L (ref 20–33)
CREAT SERPL-MCNC: 0.66 MG/DL (ref 0.5–1.4)
EOSINOPHIL # BLD AUTO: 0.19 K/UL (ref 0–0.51)
EOSINOPHIL NFR BLD: 1.8 % (ref 0–6.9)
ERYTHROCYTE [DISTWIDTH] IN BLOOD BY AUTOMATED COUNT: 46.5 FL (ref 35.9–50)
GLUCOSE SERPL-MCNC: 96 MG/DL (ref 65–99)
GRAM STN SPEC: ABNORMAL
HCT VFR BLD AUTO: 44.5 % (ref 42–52)
HGB BLD-MCNC: 14.5 G/DL (ref 14–18)
LYMPHOCYTES # BLD AUTO: 2.44 K/UL (ref 1–4.8)
LYMPHOCYTES NFR BLD: 23 % (ref 22–41)
MANUAL DIFF BLD: NORMAL
MCH RBC QN AUTO: 29.3 PG (ref 27–33)
MCHC RBC AUTO-ENTMCNC: 32.6 G/DL (ref 33.7–35.3)
MCV RBC AUTO: 89.9 FL (ref 81.4–97.8)
MONOCYTES # BLD AUTO: 0.84 K/UL (ref 0–0.85)
MONOCYTES NFR BLD AUTO: 7.9 % (ref 0–13.4)
MORPHOLOGY BLD-IMP: NORMAL
MYELOCYTES NFR BLD MANUAL: 1.8 %
NEUTROPHILS # BLD AUTO: 6.94 K/UL (ref 1.82–7.42)
NEUTROPHILS NFR BLD: 64.6 % (ref 44–72)
NEUTS BAND NFR BLD MANUAL: 0.9 % (ref 0–10)
NRBC # BLD AUTO: 0 K/UL
NRBC BLD-RTO: 0 /100 WBC
PLATELET # BLD AUTO: 320 K/UL (ref 164–446)
PLATELET BLD QL SMEAR: NORMAL
PMV BLD AUTO: 9.9 FL (ref 9–12.9)
POIKILOCYTOSIS BLD QL SMEAR: NORMAL
POTASSIUM SERPL-SCNC: 4.1 MMOL/L (ref 3.6–5.5)
RBC # BLD AUTO: 4.95 M/UL (ref 4.7–6.1)
RBC BLD AUTO: PRESENT
SIGNIFICANT IND 70042: ABNORMAL
SIGNIFICANT IND 70042: NORMAL
SITE SITE: ABNORMAL
SITE SITE: NORMAL
SODIUM SERPL-SCNC: 137 MMOL/L (ref 135–145)
SOURCE SOURCE: ABNORMAL
SOURCE SOURCE: NORMAL
WBC # BLD AUTO: 10.6 K/UL (ref 4.8–10.8)

## 2019-12-21 PROCEDURE — 99233 SBSQ HOSP IP/OBS HIGH 50: CPT | Performed by: INTERNAL MEDICINE

## 2019-12-21 PROCEDURE — A9270 NON-COVERED ITEM OR SERVICE: HCPCS | Performed by: HOSPITALIST

## 2019-12-21 PROCEDURE — 700102 HCHG RX REV CODE 250 W/ 637 OVERRIDE(OP): Performed by: HOSPITALIST

## 2019-12-21 PROCEDURE — 99232 SBSQ HOSP IP/OBS MODERATE 35: CPT | Performed by: INTERNAL MEDICINE

## 2019-12-21 PROCEDURE — 700102 HCHG RX REV CODE 250 W/ 637 OVERRIDE(OP): Performed by: NURSE PRACTITIONER

## 2019-12-21 PROCEDURE — 85027 COMPLETE CBC AUTOMATED: CPT

## 2019-12-21 PROCEDURE — 700111 HCHG RX REV CODE 636 W/ 250 OVERRIDE (IP): Performed by: HOSPITALIST

## 2019-12-21 PROCEDURE — 80048 BASIC METABOLIC PNL TOTAL CA: CPT

## 2019-12-21 PROCEDURE — A9270 NON-COVERED ITEM OR SERVICE: HCPCS | Performed by: NURSE PRACTITIONER

## 2019-12-21 PROCEDURE — 85007 BL SMEAR W/DIFF WBC COUNT: CPT

## 2019-12-21 PROCEDURE — 770006 HCHG ROOM/CARE - MED/SURG/GYN SEMI*

## 2019-12-21 RX ADMIN — CEFAZOLIN SODIUM 2 G: 2 INJECTION, SOLUTION INTRAVENOUS at 21:36

## 2019-12-21 RX ADMIN — METHADONE HYDROCHLORIDE 10 MG: 10 TABLET ORAL at 06:05

## 2019-12-21 RX ADMIN — METHADONE HYDROCHLORIDE 10 MG: 10 TABLET ORAL at 17:01

## 2019-12-21 RX ADMIN — CEFAZOLIN SODIUM 2 G: 2 INJECTION, SOLUTION INTRAVENOUS at 06:10

## 2019-12-21 RX ADMIN — OXYCODONE HYDROCHLORIDE 10 MG: 10 TABLET ORAL at 11:37

## 2019-12-21 RX ADMIN — CEFAZOLIN SODIUM 2 G: 2 INJECTION, SOLUTION INTRAVENOUS at 15:01

## 2019-12-21 RX ADMIN — ENOXAPARIN SODIUM 40 MG: 100 INJECTION SUBCUTANEOUS at 06:05

## 2019-12-21 RX ADMIN — OXYCODONE HYDROCHLORIDE 5 MG: 5 TABLET ORAL at 04:02

## 2019-12-21 ASSESSMENT — ENCOUNTER SYMPTOMS
NECK PAIN: 0
NERVOUS/ANXIOUS: 0
SPEECH CHANGE: 0
HEADACHES: 0
SHORTNESS OF BREATH: 0
INSOMNIA: 0
DEPRESSION: 0
WEAKNESS: 0
WHEEZING: 0
COUGH: 0
HALLUCINATIONS: 0
CONSTIPATION: 0
ABDOMINAL PAIN: 0
FOCAL WEAKNESS: 0
SEIZURES: 0
CHILLS: 0
PALPITATIONS: 0
MYALGIAS: 0
FEVER: 0
NAUSEA: 0
BACK PAIN: 0
SENSORY CHANGE: 0
VOMITING: 0
DIARRHEA: 0

## 2019-12-21 ASSESSMENT — LIFESTYLE VARIABLES: SUBSTANCE_ABUSE: 1

## 2019-12-21 NOTE — PROGRESS NOTES
Hospital Medicine Daily Progress Note    Date of Service  12/21/2019    Chief Complaint  Wound check    Hospital Course   Mr. Gunderson is a 54 y/o male who presented to the ED on 12/15/19 with a possible abscess to his right forearm in addition to 2 large open wounds on the inside of his left thigh with a large amount of purulent drainage where he has been injecting heroin. He was told he would need medical clearance prior to undergoing rehab through [facility]. A CT scan was done and showed extensive soft tissue induration and/or edema throughout the subcutaneous fat and intramuscular fat planes of the left lower extremity which could be due to cellulitis or edema. There was no appreciable abscess or evidence of osteomyelitis. Wound culture was positive for group A strep and MSSA, and his blood cultures were also positive for MSSA in 1 out of 2 bottles. An echocardiogram, wound consult, and lumbar spine MRI were subsequently ordered. The MRI & TTE were negative. Cardiology was then consulted on 12/18/19 for BAILEY, but refused to do it, as they felt it wasn't warranted. An US of the LE was also done and had no evidence of arterial insufficiency. The wound care RN also recommended a surgical consult for possible I&D, which was performed on 12/18/19 by Dr. Ward. A PICC line was placed on 12/20/19. His anticipated antibiotic end date is now 1/14/2020.         Interval Problem Update  Feeling good, pain is controlled. No w/d symptoms on current dose of methadone.     POD #3 from I&D. Intraoperative cultures prelim positive for S. Viridans.   12/17/19 BC still have NGTD.      CBC & BMP today are normal. Leukocytosis has resolved.     Afebrile overnight, HR 60s-70s, SBP 100s, O2 sats WNL on room air.     Consultants/Specialty  Infectious disease  Cardiology  Orthopedic surgery    Code Status  Full code    Disposition   Medically clear for transfer to LTAC when bed available, possibly this weekend  but more likely on Monday.   Will need suture removal in 2 weeks.     Review of Systems  Review of Systems   Constitutional: Negative for chills, fever and malaise/fatigue.   Respiratory: Negative for cough, shortness of breath and wheezing.    Cardiovascular: Negative for chest pain, palpitations and leg swelling.   Gastrointestinal: Negative for abdominal pain, constipation, diarrhea, nausea and vomiting.   Genitourinary: Negative for dysuria, frequency and urgency.   Skin:        + multiple wounds throughout his body (left anterior shin, right forearm, left upper medial thigh, left lower anterior medial shin); purulent drainage noted from inner thigh wounds  Surgical wrap remains in place   Neurological: Negative for sensory change, speech change, focal weakness, seizures and weakness.   Psychiatric/Behavioral: Positive for substance abuse (IV heroin). Negative for depression and hallucinations. The patient is not nervous/anxious and does not have insomnia.    All other systems reviewed and are negative.     Physical Exam  Temp:  [36.2 °C (97.1 °F)-36.6 °C (97.8 °F)] 36.2 °C (97.1 °F)  Pulse:  [60-84] 84  Resp:  [18-20] 18  BP: (102-115)/(60-92) 115/92  SpO2:  [94 %-98 %] 98 %    Physical Exam  Vitals signs and nursing note reviewed.   Constitutional:       General: He is awake. He is not in acute distress.     Appearance: Normal appearance. He is well-developed and overweight. He is not ill-appearing.   HENT:      Head: Normocephalic and atraumatic.      Mouth/Throat:      Lips: Pink.      Mouth: Mucous membranes are moist.   Eyes:      Conjunctiva/sclera: Conjunctivae normal.      Pupils: Pupils are equal, round, and reactive to light.   Neck:      Musculoskeletal: Normal range of motion and neck supple.      Vascular: No JVD.   Cardiovascular:      Rate and Rhythm: Normal rate and regular rhythm.      Pulses: Normal pulses.      Heart sounds: Normal heart sounds.   Pulmonary:      Effort: Pulmonary effort is  normal.      Breath sounds: Normal breath sounds.   Abdominal:      General: Bowel sounds are normal. There is no distension.      Palpations: Abdomen is soft.      Tenderness: There is no tenderness.   Musculoskeletal:      Lumbar back: He exhibits no tenderness, no bony tenderness and no pain.      Right lower leg: No edema.      Left lower leg: Edema (mild, dependent, just distal to the surgical wrap) present.   Skin:     General: Skin is warm and dry.      Findings: Abscess, signs of injury and wound present. No erythema.             Comments: Left leg is now covered with a surgical dressing; unable to visualize wounds at this time.   Neurological:      General: No focal deficit present.      Mental Status: He is alert and oriented to person, place, and time.      GCS: GCS eye subscore is 4. GCS verbal subscore is 5. GCS motor subscore is 6.      Cranial Nerves: Cranial nerves are intact.      Sensory: Sensation is intact.      Motor: Motor function is intact.      Coordination: Coordination is intact.      Gait: Gait is intact.   Psychiatric:         Attention and Perception: Attention and perception normal.         Mood and Affect: Mood and affect normal.         Speech: Speech normal.         Behavior: Behavior normal. Behavior is cooperative.         Thought Content: Thought content normal.         Cognition and Memory: Cognition and memory normal.         Judgment: Judgment normal.      Comments: Pleasant & cooperative.      Fluids    Intake/Output Summary (Last 24 hours) at 12/21/2019 1148  Last data filed at 12/20/2019 1330  Gross per 24 hour   Intake 240 ml   Output no documentation   Net 240 ml     Laboratory  Recent Labs     12/19/19  0321 12/20/19  0258 12/21/19  0555   WBC 16.6* 15.7* 10.6   RBC 5.12 4.83 4.95   HEMOGLOBIN 15.0 14.2 14.5   HEMATOCRIT 45.7 43.9 44.5   MCV 89.3 90.9 89.9   MCH 29.3 29.4 29.3   MCHC 32.8* 32.3* 32.6*   RDW 43.5 45.2 46.5   PLATELETCT 381 324 320   MPV 9.9 9.7 9.9      Recent Labs     12/19/19  0321 12/20/19  0258 12/20/19  1034 12/21/19  0555   SODIUM 135 144  --  137   POTASSIUM 4.7 5.9* 5.1 4.1   CHLORIDE 103 109  --  105   CO2 21 25  --  26   GLUCOSE 128* 114*  --  96   BUN 21 23*  --  21   CREATININE 0.75 0.95  --  0.66   CALCIUM 9.1 9.4  --  8.8     Imaging  IR-PICC LINE PLACEMENT W/ GUIDANCE > AGE 5   Final Result                  Ultrasound-guided PICC placement performed by qualified nursing staff as    above.          EC-ECHOCARDIOGRAM COMPLETE W/O CONT   Final Result      MR-LUMBAR SPINE-WITH & W/O   Final Result      1.  No evidence of discitis/osteomyelitis or abnormal fluid collection.   2.  Congenital/developmental narrowing of the lumbar spinal canal.   3.  L2-S1 degenerative changes as detailed above.      US-DIANE SINGLE LEVEL BILAT   Final Result      US-EXTREMITY ARTERY LOWER UNILAT W/DIANE (COMBO) LEFT         CT-EXTREMITY, LOWER WITH LEFT   Final Result      1.  Extensive soft tissue induration and/or edema is identified throughout the subcutaneous fat and intramuscular fat planes of the left lower extremity. Findings could be due to cellulitis or edema.      2.  No soft tissue abscess is appreciated.      3.  No bone erosion is identified that would suggest osteomyelitis.      4.  Prominent dilated superficial veins are identified likely related to congestion inflammation or infection.      5.  Enlarged left inguinal lymph node is identified likely due to inflammation or infection.      EC-BAILEY W/O CONT    (Results Pending)      Assessment/Plan  * Wound infection, left inner thigh- (present on admission)  Assessment & Plan  -CT scan negative for abscess or osteomyelitis.   -S/p surgical I&D by Dr. Ward on 12/18/2018.  Intraoperative cultures prelim positive for S. viridans. No further surgical plans per orthopedic surgery. Sutures to be removed in 2 weeks.   -ID following.  Continue antibiotics per their recommendations.  -Wound culture + MSSA & group A  strep. BC also + for MSSA.   -Continue wound care.     Bacteremia due to methicillin resistant Staphylococcus aureus- (present on admission)  Assessment & Plan  -ID following.  -TTE negative.  Cardiology doesn't feel BAILEY is warranted.   -MRI lumbar spine with contrast ordered due to L-spine pain & point tenderness, was negative for abscess, diskitis, osteo.   -Continuing IV ancef per ID recs, end date 1/14/2020.  -Repeat blood cultures drawn 12/17/2019 have no growth to date. PICC line placed 12/20/19.      LLE Cellulitis- (present on admission)  Assessment & Plan  -CT scan negative for abscess or osteomyelitis.   -ID following. Continue abx per their recs.   -Clinically improving. Continue to monitor progression.     Chronic hepatitis C virus infection (HCC)- (present on admission)  Assessment & Plan  -Recommend outpatient follow up.     Heroin abuse (HCC)- (present on admission)  Assessment & Plan  -Continue to reiterate the importance of cessation.   -Withdrawal symptoms controlled on twice daily methadone.  Continue to reassess.     VTE prophylaxis: Lovenox      Electronically signed by:  Tona Caba, MSN, RN, APRN, ACNPC-AG, CCRN  Nurse Practitioner, [facility]  Work # [phone]  Cell # [phone] (call, text, or tiger text)    12/21/2019    11:49 AM

## 2019-12-21 NOTE — PROGRESS NOTES
Infectious Disease Progress Note    Author: Kelsi Hedrick M.D. Date & Time of service: 2019  10:17 AM    Chief Complaint:  MSSA bacteremia     Interval history:    AF, new left leg nodular lesion concerning for abscess.  Ongoing purulent drainage from left thigh abscess.  Patient had TTE today and awaiting results.   afebrile WBC 15.7.  Patient is eating breakfast.  He has no complaints and feels well.  No pain at the surgical site.   afebrile WBC 10.6.  Patient is ambulating in his room.  He feels well.  Plan for transfer to [facility] on Monday    Review of Systems:  Review of Systems   Constitutional: Negative for chills, fever and malaise/fatigue.   Respiratory: Negative for cough and shortness of breath.    Gastrointestinal: Negative for abdominal pain, constipation, diarrhea, nausea and vomiting.   Musculoskeletal: Negative for back pain, joint pain, myalgias and neck pain.   Neurological: Negative for headaches.       Hemodynamics:  Temp (24hrs), Av.3 °C (97.4 °F), Min:36.2 °C (97.1 °F), Max:36.6 °C (97.8 °F)  Temperature: 36.2 °C (97.1 °F)  Pulse  Av.2  Min: 52  Max: 88   Blood Pressure: 115/92       Physical Exam:  Physical Exam  Constitutional:       Appearance: Normal appearance.   HENT:      Mouth/Throat:      Mouth: Mucous membranes are moist.      Pharynx: No oropharyngeal exudate.   Eyes:      Extraocular Movements: Extraocular movements intact.      Conjunctiva/sclera: Conjunctivae normal.      Pupils: Pupils are equal, round, and reactive to light.   Neck:      Musculoskeletal: Normal range of motion and neck supple.   Cardiovascular:      Rate and Rhythm: Normal rate and regular rhythm.      Heart sounds: Normal heart sounds.   Pulmonary:      Effort: Pulmonary effort is normal.      Breath sounds: Normal breath sounds.   Abdominal:      General: Abdomen is flat. Bowel sounds are normal.      Palpations: Abdomen is soft.   Musculoskeletal:      Comments: Left lower  extremity in primary surgical bandage.  Toes are warm and wiggles    Left upper extremity PICC line-nontender, no surrounding erythema   Skin:     General: Skin is warm and dry.   Neurological:      General: No focal deficit present.      Mental Status: He is alert and oriented to person, place, and time.      Cranial Nerves: No cranial nerve deficit.   Psychiatric:         Mood and Affect: Mood normal.         Behavior: Behavior normal.      Comments: Pleasant         Meds:    Current Facility-Administered Medications:   •  methadone  •  ceFAZolin  •  senna-docusate **AND** polyethylene glycol/lytes **AND** magnesium hydroxide **AND** bisacodyl  •  enoxaparin  •  acetaminophen  •  Notify provider if pain remains uncontrolled **AND** Use the numeric rating scale (NRS-11) on regular floors and Critical-Care Pain Observation Tool (CPOT) on ICUs/Trauma to assess pain **AND** Pulse Ox (Oximetry) **AND** Pharmacy Consult Request **AND** If patient difficult to arouse and/or has respiratory depression, stop any opiates that are currently infusing and call a Rapid Response. **AND** oxyCODONE immediate-release **AND** oxyCODONE immediate-release **AND** morphine injection  •  ondansetron  •  ondansetron  •  promethazine  •  promethazine  •  prochlorperazine    Labs:  Recent Labs     12/19/19  0321 12/20/19  0258 12/21/19  0555   WBC 16.6* 15.7* 10.6   RBC 5.12 4.83 4.95   HEMOGLOBIN 15.0 14.2 14.5   HEMATOCRIT 45.7 43.9 44.5   MCV 89.3 90.9 89.9   MCH 29.3 29.4 29.3   RDW 43.5 45.2 46.5   PLATELETCT 381 324 320   MPV 9.9 9.7 9.9   NEUTSPOLYS 88.30* 70.00 64.60   LYMPHOCYTES 7.10* 19.30* 23.00   MONOCYTES 2.70 5.70 7.90   EOSINOPHILS 0.00 1.10 1.80   BASOPHILS 0.20 0.80 0.00   RBCMORPHOLO  --   --  Present     Recent Labs     12/19/19  0321 12/20/19  0258 12/20/19  1034 12/21/19  0555   SODIUM 135 144  --  137   POTASSIUM 4.7 5.9* 5.1 4.1   CHLORIDE 103 109  --  105   CO2 21 25  --  26   GLUCOSE 128* 114*  --  96   BUN 21  23*  --  21     Recent Labs     12/19/19  0321 12/20/19  0258 12/21/19  0555   CREATININE 0.75 0.95 0.66       Imaging:  Ct-extremity, Lower With Left    Result Date: 12/15/2019  12/15/2019 1:25 PM HISTORY/REASON FOR EXAM:  Left leg swelling and redness. TECHNIQUE/EXAM DESCRIPTION AND NUMBER OF VIEWS:  CT scan of the LEFT lower extremity with contrast, with reconstructions. Thin helical 3 mm sections were obtained from the distal femur through the proximal tibia/fibula. Sagittal and coronal multiplanar reconstructions were generated from the axial images. A total of 80 mL of Omnipaque 350 nonionic contrast was administered IV without complication. Up to date radiation dose reduction adjustments have been utilized to meet ALARA standards for radiation dose reduction. COMPARISON: None. FINDINGS: There is abnormally increased attenuation throughout the subcutaneous fat and intramuscular fat planes in the left lower extremity. This process is most prominent in the lower leg area below the level of the knee and present to a lesser degree in the thigh region. Prominent dilated veins are identified which are most prominent superficially and medially. No localized fluid collection that would suggest abscess is identified. No bone erosion or bone destruction is identified. Enlarged lymph node in the left inguinal area is identified with short axis diameter 1.8 cm.     1.  Extensive soft tissue induration and/or edema is identified throughout the subcutaneous fat and intramuscular fat planes of the left lower extremity. Findings could be due to cellulitis or edema. 2.  No soft tissue abscess is appreciated. 3.  No bone erosion is identified that would suggest osteomyelitis. 4.  Prominent dilated superficial veins are identified likely related to congestion inflammation or infection. 5.  Enlarged left inguinal lymph node is identified likely due to inflammation or infection.    Mr-lumbar Spine-with & W/o    Result Date:  2019 9:59 PM HISTORY/REASON FOR EXAM:  MSSA infection with L-spine pain and point tenderness TECHNIQUE/EXAM DESCRIPTION: MRI of the lumbar spine without and with contrast. The study was performed on a [employer] 1.5 Carlotta MRI scanner. T1 sagittal, T2 fast spin-echo sagittal, and T2 axial images were obtained of the lumbar spine. T1 postcontrast fat-suppressed sagittal images were obtained. 20 mL ProHance contrast was administered intravenously. COMPARISON:  None. FINDINGS: 5 lumbar type vertebral bodies are counted. Conus medullaris terminates at T12-L1 and is normal in signal. Straightening of the lumbar spine. No compression fracture. No suspicious abnormal disc or bone marrow edema or enhancement is seen to suggest discitis osteomyelitis. No epidural fluid collection is seen. There is congenital narrowing of the lumbar spinal canal. T11-L2: Normal. L2-L3: Diffuse disc bulge. Borderline central canal stenosis. Mild right foraminal narrowing. L3-L4: Mild diffuse disc bulge. No significant spinal or foraminal stenosis. L4-L5: Diffuse disc bulge, posterior annular fissure and disc protrusion. Mild facet degeneration. Mild spinal stenosis. Right lateral recess stenosis which may involve the right L5 nerve. Correlate for right L5 radiculopathy. Mild bilateral foraminal stenosis. L5-S1: Disc narrowing, diffuse disc osteophyte and posterior annular fissure. Borderline central canal stenosis. Lateral recess narrowing, left greater than right and at least moderate foraminal stenosis.     1.  No evidence of discitis/osteomyelitis or abnormal fluid collection. 2.  Congenital/developmental narrowing of the lumbar spinal canal. 3.  L2-S1 degenerative changes as detailed above.    Us-steve Single Level Bilat    Result Date: 2019   Vascular Laboratory  Conclusions  No evidence of arterial insufficiency.  KENDRICK ATWOOD  Age:    53    Gender:     M  MRN:    [id number]  :    1965      BSA:  Exam Date:      12/16/2019 08:10  Room #:     Inpatient  Priority:     Routine  Ht (in):             Wt (lb):  Ordering Physician:        VALERIE AVALOS  Referring Physician:       VALERIE AVALOS  Sonographer:               Morales Suggs RVT, RDMS  Study Type:                Complete Bilateral  Technical Quality:         Adequate  Indications:     Atherosclerosis of native arteries of left leg with                   ulceration of unspecified site  CPT Codes:       29340  ICD Codes:       I70.249  History:         Nonhealing wound of left lower extremity  Limitations:     Unable to obtain right brachial pressure due to line in arm,                    Unable to obtain left popliteal waveform due to wound                   dressing                 RIGHT  Waveform            Systolic BPs (mmHg)                                           Brachial  Triphasic                                Common Femoral  Triphasic                  135           Posterior Tibial  Triphasic                  137           Dorsalis Pedis                                           Peroneal                             1.18          DIANE                                           TBI                       LEFT  Waveform        Systolic BPs (mmHg)                             116           Brachial  Triphasic                                Common Femoral  Triphasic                  132           Posterior Tibial  Triphasic                  134           Dorsalis Pedis                                           Peroneal                             1.16          DIANE                                           TBI  Findings  Bilateral.  Doppler waveform of the common femoral artery is of high amplitude and  triphasic.  Doppler waveforms at the ankle are brisk and triphasic.  Ankle-brachial index is normal.  Additional testing was not performed in accordance with lower extremity  arterial evaluation protocol.  Charan Cruz MD   (Electronically Signed)  Final Date:      2019                   10:58    Ec-echocardiogram Complete W/o Cont    Result Date: 2019  Transthoracic Echo Report Echocardiography Laboratory CONCLUSIONS No prior study is available for comparison. Normal left ventricular systolic function. No evidence of valvular abnormality based on Doppler evaluation. ATWOODKENDRICK Exam Date:         2019                    12:11 Exam Location:     Inpatient Priority:          Routine Ordering Physician:        ARON LAL Referring Physician:       LUKASZ Kitchen Sonographer:               Belkis Marquez Age:    53     Gender:    M MRN:    [id number] :    1965 BSA:    2.21   Ht (in):    74     Wt (lb):    208 Exam Type:     Complete Indications:     Endocarditis ICD Codes:       421 CPT Codes:       79938 BP:   106    /   74     HR: Technical Quality:       Fair MEASUREMENTS  (Male / Female) Normal Values 2D ECHO LV Diastolic Diameter PLAX        4.3 cm                4.2 - 5.9 / 3.9 - 5.3 cm LV Systolic Diameter PLAX         3.7 cm                2.1 - 4.0 cm IVS Diastolic Thickness           1.3 cm                LVPW Diastolic Thickness          1.1 cm                LVOT Diameter                     2.9 cm                Estimated LV Ejection Fraction    55 %                  LV Ejection Fraction MOD 4C       48.7 %                LV Ejection Fraction 4C AL        49.7 %                DOPPLER Mitral E Point Velocity           0.3 m/s               Mitral E to A Ratio               0.65                  MV Deceleration Time              153 ms                PV Peak Velocity                  0.84 m/s              PV Peak Gradient                  2.8 mmHg              RVOT Peak Velocity                0.49 m/s              LV E' Lateral Velocity            13.6 cm/s             Mitral E to LV E' Lateral Ratio   2.2                   LV E' Septal Velocity             20.9 cm/s             Mitral  E to LV E' Septal Ratio    1.4                   * Indicates values subject to auto-interpretation LV EF:  55    % FINDINGS Left Ventricle Normal left ventricular chamber size. Mild concentric left ventricular hypertrophy. Left ventricular systolic function is normal. Left ventricular ejection fraction is visually estimated to be 55%. Right Ventricle Normal right ventricular size and systolic function. Right Atrium Normal right atrial size. Left Atrium Left atrium not well visualized. Mitral Valve Structurally normal mitral valve. No mitral stenosis. Trace mitral regurgitation. Aortic Valve Structurally normal aortic valve. Tricuspid Valve Structurally normal tricuspid valve. No stenosis or regurgitation seen. Unable to estimate pulmonary artery pressure due to an inadequate tricuspid regurgitant jet. Pulmonic Valve The pulmonic valve is not well visualized. No stenosis or regurgitation seen. Pericardium Normal pericardium without effusion. Aorta Ascending aorta not well visualized. Salvatore ALBARADO To (Electronically Signed) Final Date:     2019                 16:52    Us-extremity Artery Lower Unilat W/steve (combo) Left    Result Date: 2019   Vascular Laboratory  Conclusions  KENDRICK ATWOOD  Age:    53    Gender:     M  MRN:    [id number]  :    1965      BSA:  Exam Date:     2019 08:10  Room #:     Inpatient  Priority:     Routine  Ht (in):             Wt (lb):  Ordering Physician:        VALERIE AVALOS  Referring Physician:       [id number] Underwood  Sonographer:               Morales Suggs RVT, RDMS  Study Type:                Complete Bilateral  Technical Quality:         Adequate  Indications:     Atherosclerosis of native arteries of left leg with                   ulceration of unspecified site  CPT Codes:       23764  ICD Codes:       I70.249  History:         Nonhealing wound of left lower extremity  Limitations:     Unable to obtain right brachial  pressure due to line in arm,                    Unable to obtain left popliteal waveform due to wound                   dressing                 RIGHT  Waveform            Systolic BPs (mmHg)                                           Brachial  Triphasic                                Common Femoral  Triphasic                  135           Posterior Tibial  Triphasic                  137           Dorsalis Pedis                                           Peroneal                             1.18          DIANE                                           TBI                       LEFT  Waveform        Systolic BPs (mmHg)                             116           Brachial  Triphasic                                Common Femoral  Triphasic                  132           Posterior Tibial  Triphasic                  134           Dorsalis Pedis                                           Peroneal                             1.16          DIANE                                           TBI  Findings  Bilateral.  Doppler waveform of the common femoral artery is of high amplitude and  triphasic.  Doppler waveforms at the ankle are brisk and triphasic.  Ankle-brachial index is normal.  Additional testing was not performed in accordance with lower extremity  arterial evaluation protocol.       Micro:  Results     Procedure Component Value Units Date/Time    CULTURE WOUND W/ GRAM STAIN [[id number]]  (Abnormal) Collected:  12/18/19 1219    Order Status:  Completed Specimen:  Wound Updated:  12/21/19 0834     Significant Indicator POS     Source WND     Site Left thigh wound     Culture Result Growth noted after further incubation, see below for  organism identification.       Gram Stain Result Few WBCs.  No organisms seen.       Culture Result Viridans Streptococcus  Rare growth      Narrative:       Surgery - swabs received    Anaerobic Culture [[id number]] Collected:  12/18/19 1219    Order Status:  Completed Specimen:  Wound Updated:   "12/21/19 0834     Significant Indicator NEG     Source WND     Site Left thigh wound     Culture Result Culture in progress.    Narrative:       Surgery - swabs received    BLOOD CULTURE [[id number]] Collected:  12/15/19 1151    Order Status:  Completed Specimen:  Blood from Peripheral Updated:  12/20/19 1300     Significant Indicator NEG     Source BLD     Site PERIPHERAL     Culture Result No growth after 5 days of incubation.    Narrative:       1 of 2 for Blood Culture x 2 sites order. Per Hospital  Policy: Only change Specimen Src: to \"Line\" if specified by  physician order.  Left Forearm/Arm    BLOOD CULTURE [[id number]]  (Abnormal)  (Susceptibility) Collected:  12/15/19 1130    Order Status:  Completed Specimen:  Blood from Peripheral Updated:  12/19/19 1022     Significant Indicator POS     Source BLD     Site PERIPHERAL     Culture Result Growth detected by Bactec instrument. 12/16/2019  10:18  Staphylococcus aureus (methicillin sensitive)  detected by PCR.        Staphylococcus aureus    Narrative:       CALL  Dsouza  MS5 tel. [phone],  CALLED  MS5 tel. [phone] 12/16/2019, 10:24, called [phone] Dionne  2 of 2 blood culture x2  Sites order. Per Hospital Policy:  Only change Specimen Src: to \"Line\" if specified by physician  order.  No site indicated    Susceptibility     Staphylococcus aureus (1)     Antibiotic Interpretation Microscan Method Status    Clindamycin Sensitive <=0.5 mcg/mL ANTONIO Final    Daptomycin Sensitive <=0.5 mcg/mL ANTONIO Final    Erythromycin Sensitive <=0.5 mcg/mL ANTONIO Final    Moxifloxacin Sensitive 1 mcg/mL ANTONIO Final    Ampicillin/sulbactam Sensitive <=8/4 mcg/mL ANTONIO Final    Oxacillin Sensitive <=0.25 mcg/mL ANTONIO Final    Vancomycin Sensitive 1 mcg/mL ANTONIO Final    Penicillin Resistant 2 mcg/mL ANTONIO Final    Trimeth/Sulfa Sensitive <=0.5/9.5 mcg/mL ANTONIO Final    Tetracycline Sensitive <=4 mcg/mL ANTONIO Final                   GRAM STAIN [[id number]] Collected:  12/18/19 1219    Order Status:  " "Completed Specimen:  Wound Updated:  12/18/19 1409     Significant Indicator .     Source WND     Site Left thigh wound     Gram Stain Result Few WBCs.  No organisms seen.      Narrative:       Surgery - swabs received    BLOOD CULTURE [[id number]] Collected:  12/17/19 0327    Order Status:  Completed Specimen:  Blood from Peripheral Updated:  12/18/19 0729     Significant Indicator NEG     Source BLD     Site PERIPHERAL     Culture Result No Growth  Note: Blood cultures are incubated for 5 days and  are monitored continuously.Positive blood cultures  are called to the RN and reported as soon as  they are identified.      Narrative:       Per Hospital Policy: Only change Specimen Src: to \"Line\" if  specified by physician order.  Left AC    BLOOD CULTURE [[id number]] Collected:  12/17/19 0332    Order Status:  Completed Specimen:  Blood from Peripheral Updated:  12/18/19 0729     Significant Indicator NEG     Source BLD     Site PERIPHERAL     Culture Result No Growth  Note: Blood cultures are incubated for 5 days and  are monitored continuously.Positive blood cultures  are called to the RN and reported as soon as  they are identified.      Narrative:       Per Hospital Policy: Only change Specimen Src: to \"Line\" if  specified by physician order.  Right Forearm/Arm    Culture Wound W/ Gram Stain [[id number]]  (Abnormal)  (Susceptibility) Collected:  12/15/19 1115    Order Status:  Completed Specimen:  Wound from Left Leg Updated:  12/17/19 0859     Significant Indicator POS     Source WND     Site LEFT LEG     Culture Result -     Gram Stain Result Moderate WBCs.  Many Gram positive cocci.       Culture Result Beta Hemolytic Streptococcus group A  Heavy growth        Staphylococcus aureus  Moderate growth      Narrative:       CALL  Dsouza  MS5 tel. [phone],  CALLED  MS5 tel. [phone] 12/16/2019, 15:13, RB PERF. RESULTS CALLED  TO:Maryann [id number] Rn    Susceptibility     Staphylococcus aureus (2)     Antibiotic " Interpretation Microscan Method Status    Clindamycin Sensitive <=0.5 mcg/mL ANTONIO Final    Daptomycin Sensitive <=0.5 mcg/mL ANTONIO Final    Erythromycin Sensitive <=0.5 mcg/mL ANTONIO Final    Moxifloxacin Sensitive 1 mcg/mL ANTONIO Final    Ampicillin/sulbactam Sensitive <=8/4 mcg/mL ANTONIO Final    Oxacillin Sensitive <=0.25 mcg/mL ANTONIO Final    Vancomycin Sensitive 1 mcg/mL ANTONIO Final    Penicillin Resistant 2 mcg/mL ANTONIO Final    Trimeth/Sulfa Sensitive <=0.5/9.5 mcg/mL ATNONIO Final    Tetracycline Sensitive <=4 mcg/mL ANTONIO Final                   GRAM STAIN [[id number]] Collected:  12/15/19 1115    Order Status:  Completed Specimen:  Wound Updated:  12/15/19 2120     Significant Indicator .     Source WND     Site LEFT LEG     Gram Stain Result Moderate WBCs.  Many Gram positive cocci.            Assessment:  Active Hospital Problems    Diagnosis   • *Wound infection, left inner thigh [T14.8XXA, L08.9]   • Bacteremia due to methicillin resistant Staphylococcus aureus [R78.81]   • LLE Cellulitis [L03.90]   • Heroin abuse ([facility]) [F11.10]       ASSESSMENT/PLAN:      Jaime Gunderson is a 53 y.o.  admitted 12/15/2019. Pt has a past medical history of injury to left lower leg with chronic nonhealing wound as well as active IV drug use with heroin.  He has a history of injecting into veins of right arm as well as left leg.  He states he had a recent abscess on his right arm which he cut open himself and drained and it is healed.  He then had a similar appearance on his left thigh with edema, erythema and drainage at injection site.  He then used a razor blade and cut open the wound and attempt to drain it.  However, the left thigh had increasing erythema pain and drainage.  His symptoms been ongoing for approximately 7 days and due to worsening wound on his left leg he presented to the ER.     Hospital Course:   The patient is been afebrile and no significant leukocytosis.  Blood cultures on 12/15 1/2+ for MSSA.  See below for  details.  He was admitted and started on vancomycin and Unasyn.  Now transitioned to cefazolin.      MSSA bacteremia, on treatment  Secondary to IV drug use  Leukocytosis resolved  - Blood cultures on 12/15 2/2+ for MSSA. Per PCR  - Blood cultures on 12/17 2/2- NGTD  -TTE on 12/18 with no vegetation  -BAILEY ordered and pending - unable to be performed on 12/20 per discussion with cardiology.   Continue cefazolin 2 g every 8 hours  Pt blood cultures cleared quickly and source is 2/2 below  Will plan on 4 weeks of IV abx from 12/17.  Stop date 01/14/2020     Left thigh abscess, on treatment  Left lower leg abscess   -2/2 IV drug use  -CT left leg on 12/15 with edema, no osteomyelitis, no abscess  -Wound cultures on 12/15 with group A strep and MSSA  Culture on 12/18 + viridans Streptococcus    IV drug use, active, heroin  -HIV screen negative on 12/16  -Acute hepatitis panel negative on 12/15 with exception of positive hepatitis C antibody  Not a candidate for PICC line for home or outpatient antibiotics.  Will need a monitored setting  Advised cessation     Injury to lumbar spine, chronic back pain   -MRI lumbar spine with contrast on 12/16 with no evidence of discitis/osteomyelitis or abnormal fluid collection.  -OR on 12/18 for I&D of left thigh abscess, cultures obtained    Chronic hepatitis C infection  - Known hepatitis C antibody positive, HCV quant 6,167,985 on 12/18   Not a candidate for treatment at this time given IV drug use  Patient will eventually need to follow-up for outpatient treatment for his hepatitis C    Disposition:  Pt has been accepted to [facility].  Transfer likely Monday    Plan of care discussed with Tona THOMAS and Dr. Antony of cardiology. Will continue to follow.

## 2019-12-21 NOTE — CARE PLAN
Problem: Communication  Goal: The ability to communicate needs accurately and effectively will improve  Outcome: PROGRESSING AS EXPECTED     Problem: Safety  Goal: Will remain free from injury  Outcome: PROGRESSING AS EXPECTED  Goal: Will remain free from falls  Outcome: PROGRESSING AS EXPECTED     Problem: Infection  Goal: Will remain free from infection  Outcome: PROGRESSING AS EXPECTED     Problem: Venous Thromboembolism (VTW)/Deep Vein Thrombosis (DVT) Prevention:  Goal: Patient will participate in Venous Thrombosis (VTE)/Deep Vein Thrombosis (DVT)Prevention Measures  Outcome: PROGRESSING AS EXPECTED     Problem: Bowel/Gastric:  Goal: Normal bowel function is maintained or improved  Outcome: PROGRESSING AS EXPECTED  Goal: Will not experience complications related to bowel motility  Outcome: PROGRESSING AS EXPECTED     Problem: Knowledge Deficit  Goal: Knowledge of disease process/condition, treatment plan, diagnostic tests, and medications will improve  Outcome: PROGRESSING AS EXPECTED  Goal: Knowledge of the prescribed therapeutic regimen will improve  Outcome: PROGRESSING AS EXPECTED     Problem: Discharge Barriers/Planning  Goal: Patient's continuum of care needs will be met  Outcome: PROGRESSING AS EXPECTED     Problem: Pain Management  Goal: Pain level will decrease to patient's comfort goal  Outcome: PROGRESSING AS EXPECTED     Problem: Psychosocial Needs:  Goal: Level of anxiety will decrease  Outcome: PROGRESSING AS EXPECTED     Problem: Respiratory:  Goal: Respiratory status will improve  Outcome: PROGRESSING AS EXPECTED

## 2019-12-21 NOTE — PROGRESS NOTES
"Pt alert and oriented, on RA and VSS. Pt found cleaning up blood from Left big toe nail. Pt states that he was \"cutting out ingrown toe nail and went too far because he can't see well.\" Bleeding had stopped by the time this RN found it. Pt educated on importance of letting MD assess situation before he attempts to \"fix\" issue on his own. Charge RN also notified of issue.     Fall precautions and hourly rounding in place. Will continue to monitor.   "

## 2019-12-22 PROBLEM — L03.90 CELLULITIS: Status: RESOLVED | Noted: 2019-12-15 | Resolved: 2019-12-22

## 2019-12-22 LAB
BACTERIA BLD CULT: NORMAL
BACTERIA BLD CULT: NORMAL
SIGNIFICANT IND 70042: NORMAL
SIGNIFICANT IND 70042: NORMAL
SITE SITE: NORMAL
SITE SITE: NORMAL
SOURCE SOURCE: NORMAL
SOURCE SOURCE: NORMAL

## 2019-12-22 PROCEDURE — 700102 HCHG RX REV CODE 250 W/ 637 OVERRIDE(OP): Performed by: NURSE PRACTITIONER

## 2019-12-22 PROCEDURE — 700111 HCHG RX REV CODE 636 W/ 250 OVERRIDE (IP): Performed by: HOSPITALIST

## 2019-12-22 PROCEDURE — 700102 HCHG RX REV CODE 250 W/ 637 OVERRIDE(OP): Performed by: HOSPITALIST

## 2019-12-22 PROCEDURE — A9270 NON-COVERED ITEM OR SERVICE: HCPCS | Performed by: HOSPITALIST

## 2019-12-22 PROCEDURE — 99233 SBSQ HOSP IP/OBS HIGH 50: CPT | Performed by: INTERNAL MEDICINE

## 2019-12-22 PROCEDURE — 770006 HCHG ROOM/CARE - MED/SURG/GYN SEMI*

## 2019-12-22 PROCEDURE — A9270 NON-COVERED ITEM OR SERVICE: HCPCS | Performed by: NURSE PRACTITIONER

## 2019-12-22 PROCEDURE — 99232 SBSQ HOSP IP/OBS MODERATE 35: CPT | Performed by: INTERNAL MEDICINE

## 2019-12-22 RX ADMIN — SENNOSIDES AND DOCUSATE SODIUM 2 TABLET: 8.6; 5 TABLET ORAL at 05:37

## 2019-12-22 RX ADMIN — SENNOSIDES AND DOCUSATE SODIUM 2 TABLET: 8.6; 5 TABLET ORAL at 17:18

## 2019-12-22 RX ADMIN — CEFAZOLIN SODIUM 2 G: 2 INJECTION, SOLUTION INTRAVENOUS at 05:37

## 2019-12-22 RX ADMIN — METHADONE HYDROCHLORIDE 10 MG: 10 TABLET ORAL at 17:18

## 2019-12-22 RX ADMIN — ENOXAPARIN SODIUM 40 MG: 100 INJECTION SUBCUTANEOUS at 05:36

## 2019-12-22 RX ADMIN — OXYCODONE HYDROCHLORIDE 10 MG: 10 TABLET ORAL at 18:39

## 2019-12-22 RX ADMIN — OXYCODONE HYDROCHLORIDE 10 MG: 10 TABLET ORAL at 11:23

## 2019-12-22 RX ADMIN — CEFAZOLIN SODIUM 2 G: 2 INJECTION, SOLUTION INTRAVENOUS at 13:36

## 2019-12-22 RX ADMIN — CEFAZOLIN SODIUM 2 G: 2 INJECTION, SOLUTION INTRAVENOUS at 21:08

## 2019-12-22 RX ADMIN — METHADONE HYDROCHLORIDE 10 MG: 10 TABLET ORAL at 05:37

## 2019-12-22 ASSESSMENT — ENCOUNTER SYMPTOMS
CONSTIPATION: 0
MYALGIAS: 0
NERVOUS/ANXIOUS: 0
SPEECH CHANGE: 0
FOCAL WEAKNESS: 0
NECK PAIN: 0
COUGH: 0
INSOMNIA: 0
PALPITATIONS: 0
HEADACHES: 0
ABDOMINAL PAIN: 0
SEIZURES: 0
WEAKNESS: 0
VOMITING: 0
HALLUCINATIONS: 0
SHORTNESS OF BREATH: 0
DIARRHEA: 0
NAUSEA: 0
FEVER: 0
DEPRESSION: 0
CHILLS: 0
WHEEZING: 0
SENSORY CHANGE: 0
BACK PAIN: 0

## 2019-12-22 ASSESSMENT — LIFESTYLE VARIABLES: SUBSTANCE_ABUSE: 1

## 2019-12-22 NOTE — PROGRESS NOTES
Infectious Disease Progress Note    Author: Kelsi Hedrick M.D. Date & Time of service: 2019  10:59 AM    Chief Complaint:  MSSA bacteremia     Interval history:    AF, new left leg nodular lesion concerning for abscess.  Ongoing purulent drainage from left thigh abscess.  Patient had TTE today and awaiting results.   afebrile WBC 15.7.  Patient is eating breakfast.  He has no complaints and feels well.  No pain at the surgical site.   afebrile WBC 10.6.  Patient is ambulating in his room.  He feels well.  Plan for transfer to [facility] on  afebrile.  Patient sitting up in chair.  His surgical dressing was removed and surgical sites are clean.  Plan for transfer tomorrow to [facility]    Review of Systems:  Review of Systems   Constitutional: Negative for chills, fever and malaise/fatigue.   Respiratory: Negative for cough and shortness of breath.    Gastrointestinal: Negative for abdominal pain, constipation, diarrhea, nausea and vomiting.   Musculoskeletal: Negative for back pain, joint pain, myalgias and neck pain.   Neurological: Negative for headaches.       Hemodynamics:  Temp (24hrs), Av.6 °C (97.9 °F), Min:36.4 °C (97.5 °F), Max:36.7 °C (98.1 °F)  Temperature: 36.7 °C (98.1 °F)  Pulse  Av  Min: 52  Max: 88   Blood Pressure: 117/72       Physical Exam:  Physical Exam  Constitutional:       Appearance: Normal appearance.   HENT:      Mouth/Throat:      Mouth: Mucous membranes are moist.      Pharynx: No oropharyngeal exudate.   Eyes:      Extraocular Movements: Extraocular movements intact.      Conjunctiva/sclera: Conjunctivae normal.      Pupils: Pupils are equal, round, and reactive to light.   Neck:      Musculoskeletal: Normal range of motion and neck supple.   Cardiovascular:      Rate and Rhythm: Normal rate and regular rhythm.      Heart sounds: Normal heart sounds.   Pulmonary:      Effort: Pulmonary effort is normal.      Breath sounds: Normal breath sounds.    Abdominal:      General: Abdomen is flat. Bowel sounds are normal.      Palpations: Abdomen is soft.   Musculoskeletal:      Comments: Left lower extremity surgical sites-dressed.  No drainage through dressing.  Toes are warm and wiggles    Left upper extremity PICC line-nontender, no surrounding erythema   Skin:     General: Skin is warm and dry.   Neurological:      General: No focal deficit present.      Mental Status: He is alert and oriented to person, place, and time.      Cranial Nerves: No cranial nerve deficit.   Psychiatric:         Mood and Affect: Mood normal.         Behavior: Behavior normal.      Comments: Pleasant         Meds:    Current Facility-Administered Medications:   •  methadone  •  ceFAZolin  •  senna-docusate **AND** polyethylene glycol/lytes **AND** magnesium hydroxide **AND** bisacodyl  •  enoxaparin  •  acetaminophen  •  Notify provider if pain remains uncontrolled **AND** Use the numeric rating scale (NRS-11) on regular floors and Critical-Care Pain Observation Tool (CPOT) on ICUs/Trauma to assess pain **AND** Pulse Ox (Oximetry) **AND** Pharmacy Consult Request **AND** If patient difficult to arouse and/or has respiratory depression, stop any opiates that are currently infusing and call a Rapid Response. **AND** oxyCODONE immediate-release **AND** oxyCODONE immediate-release **AND** morphine injection  •  ondansetron  •  ondansetron  •  promethazine  •  promethazine  •  prochlorperazine    Labs:  Recent Labs     12/20/19  0258 12/21/19  0555   WBC 15.7* 10.6   RBC 4.83 4.95   HEMOGLOBIN 14.2 14.5   HEMATOCRIT 43.9 44.5   MCV 90.9 89.9   MCH 29.4 29.3   RDW 45.2 46.5   PLATELETCT 324 320   MPV 9.7 9.9   NEUTSPOLYS 70.00 64.60   LYMPHOCYTES 19.30* 23.00   MONOCYTES 5.70 7.90   EOSINOPHILS 1.10 1.80   BASOPHILS 0.80 0.00   RBCMORPHOLO  --  Present     Recent Labs     12/20/19  0258 12/20/19  1034 12/21/19  0555   SODIUM 144  --  137   POTASSIUM 5.9* 5.1 4.1   CHLORIDE 109  --  105    CO2 25  --  26   GLUCOSE 114*  --  96   BUN 23*  --  21     Recent Labs     12/20/19  0258 12/21/19  0555   CREATININE 0.95 0.66       Imaging:  Ct-extremity, Lower With Left    Result Date: 12/15/2019  12/15/2019 1:25 PM HISTORY/REASON FOR EXAM:  Left leg swelling and redness. TECHNIQUE/EXAM DESCRIPTION AND NUMBER OF VIEWS:  CT scan of the LEFT lower extremity with contrast, with reconstructions. Thin helical 3 mm sections were obtained from the distal femur through the proximal tibia/fibula. Sagittal and coronal multiplanar reconstructions were generated from the axial images. A total of 80 mL of Omnipaque 350 nonionic contrast was administered IV without complication. Up to date radiation dose reduction adjustments have been utilized to meet ALARA standards for radiation dose reduction. COMPARISON: None. FINDINGS: There is abnormally increased attenuation throughout the subcutaneous fat and intramuscular fat planes in the left lower extremity. This process is most prominent in the lower leg area below the level of the knee and present to a lesser degree in the thigh region. Prominent dilated veins are identified which are most prominent superficially and medially. No localized fluid collection that would suggest abscess is identified. No bone erosion or bone destruction is identified. Enlarged lymph node in the left inguinal area is identified with short axis diameter 1.8 cm.     1.  Extensive soft tissue induration and/or edema is identified throughout the subcutaneous fat and intramuscular fat planes of the left lower extremity. Findings could be due to cellulitis or edema. 2.  No soft tissue abscess is appreciated. 3.  No bone erosion is identified that would suggest osteomyelitis. 4.  Prominent dilated superficial veins are identified likely related to congestion inflammation or infection. 5.  Enlarged left inguinal lymph node is identified likely due to inflammation or infection.    Mr-lumbar Spine-with &  W/o    Result Date: 2019 9:59 PM HISTORY/REASON FOR EXAM:  MSSA infection with L-spine pain and point tenderness TECHNIQUE/EXAM DESCRIPTION: MRI of the lumbar spine without and with contrast. The study was performed on a [employer] Signa 1.5 Carlotta MRI scanner. T1 sagittal, T2 fast spin-echo sagittal, and T2 axial images were obtained of the lumbar spine. T1 postcontrast fat-suppressed sagittal images were obtained. 20 mL ProHance contrast was administered intravenously. COMPARISON:  None. FINDINGS: 5 lumbar type vertebral bodies are counted. Conus medullaris terminates at T12-L1 and is normal in signal. Straightening of the lumbar spine. No compression fracture. No suspicious abnormal disc or bone marrow edema or enhancement is seen to suggest discitis osteomyelitis. No epidural fluid collection is seen. There is congenital narrowing of the lumbar spinal canal. T11-L2: Normal. L2-L3: Diffuse disc bulge. Borderline central canal stenosis. Mild right foraminal narrowing. L3-L4: Mild diffuse disc bulge. No significant spinal or foraminal stenosis. L4-L5: Diffuse disc bulge, posterior annular fissure and disc protrusion. Mild facet degeneration. Mild spinal stenosis. Right lateral recess stenosis which may involve the right L5 nerve. Correlate for right L5 radiculopathy. Mild bilateral foraminal stenosis. L5-S1: Disc narrowing, diffuse disc osteophyte and posterior annular fissure. Borderline central canal stenosis. Lateral recess narrowing, left greater than right and at least moderate foraminal stenosis.     1.  No evidence of discitis/osteomyelitis or abnormal fluid collection. 2.  Congenital/developmental narrowing of the lumbar spinal canal. 3.  L2-S1 degenerative changes as detailed above.    Us-steve Single Level Bilat    Result Date: 2019   Vascular Laboratory  Conclusions  No evidence of arterial insufficiency.  KENDRICK ATWOOD  Age:    53    Gender:     M  MRN:    [id number]  :    1965       BSA:  Exam Date:     12/16/2019 08:10  Room #:     Inpatient  Priority:     Routine  Ht (in):             Wt (lb):  Ordering Physician:        VALERIE AVALOS  Referring Physician:       VALERIE AVALOS  Sonographer:               Morales Suggs RVT, RDMS  Study Type:                Complete Bilateral  Technical Quality:         Adequate  Indications:     Atherosclerosis of native arteries of left leg with                   ulceration of unspecified site  CPT Codes:       25014  ICD Codes:       I70.249  History:         Nonhealing wound of left lower extremity  Limitations:     Unable to obtain right brachial pressure due to line in arm,                    Unable to obtain left popliteal waveform due to wound                   dressing                 RIGHT  Waveform            Systolic BPs (mmHg)                                           Brachial  Triphasic                                Common Femoral  Triphasic                  135           Posterior Tibial  Triphasic                  137           Dorsalis Pedis                                           Peroneal                             1.18          DIANE                                           TBI                       LEFT  Waveform        Systolic BPs (mmHg)                             116           Brachial  Triphasic                                Common Femoral  Triphasic                  132           Posterior Tibial  Triphasic                  134           Dorsalis Pedis                                           Peroneal                             1.16          DIANE                                           TBI  Findings  Bilateral.  Doppler waveform of the common femoral artery is of high amplitude and  triphasic.  Doppler waveforms at the ankle are brisk and triphasic.  Ankle-brachial index is normal.  Additional testing was not performed in accordance with lower extremity  arterial evaluation protocol.  Charan SHAFFER  Anthony PRO  (Electronically Signed)  Final Date:      2019                   10:58    Ec-echocardiogram Complete W/o Cont    Result Date: 2019  Transthoracic Echo Report Echocardiography Laboratory CONCLUSIONS No prior study is available for comparison. Normal left ventricular systolic function. No evidence of valvular abnormality based on Doppler evaluation. KENDRICK ATWOOD Exam Date:         2019                    12:11 Exam Location:     Inpatient Priority:          Routine Ordering Physician:        ARON LAL Referring Physician:       [id number] Estrella Sonographer:               Belkis Marquez Age:    53     Gender:    M MRN:    [id number] :    1965 BSA:    2.21   Ht (in):    74     Wt (lb):    208 Exam Type:     Complete Indications:     Endocarditis ICD Codes:       421 CPT Codes:       09106 BP:   106    /   74     HR: Technical Quality:       Fair MEASUREMENTS  (Male / Female) Normal Values 2D ECHO LV Diastolic Diameter PLAX        4.3 cm                4.2 - 5.9 / 3.9 - 5.3 cm LV Systolic Diameter PLAX         3.7 cm                2.1 - 4.0 cm IVS Diastolic Thickness           1.3 cm                LVPW Diastolic Thickness          1.1 cm                LVOT Diameter                     2.9 cm                Estimated LV Ejection Fraction    55 %                  LV Ejection Fraction MOD 4C       48.7 %                LV Ejection Fraction 4C AL        49.7 %                DOPPLER Mitral E Point Velocity           0.3 m/s               Mitral E to A Ratio               0.65                  MV Deceleration Time              153 ms                PV Peak Velocity                  0.84 m/s              PV Peak Gradient                  2.8 mmHg              RVOT Peak Velocity                0.49 m/s              LV E' Lateral Velocity            13.6 cm/s             Mitral E to LV E' Lateral Ratio   2.2                   LV E' Septal Velocity             20.9 cm/s              Mitral E to LV E' Septal Ratio    1.4                   * Indicates values subject to auto-interpretation LV EF:  55    % FINDINGS Left Ventricle Normal left ventricular chamber size. Mild concentric left ventricular hypertrophy. Left ventricular systolic function is normal. Left ventricular ejection fraction is visually estimated to be 55%. Right Ventricle Normal right ventricular size and systolic function. Right Atrium Normal right atrial size. Left Atrium Left atrium not well visualized. Mitral Valve Structurally normal mitral valve. No mitral stenosis. Trace mitral regurgitation. Aortic Valve Structurally normal aortic valve. Tricuspid Valve Structurally normal tricuspid valve. No stenosis or regurgitation seen. Unable to estimate pulmonary artery pressure due to an inadequate tricuspid regurgitant jet. Pulmonic Valve The pulmonic valve is not well visualized. No stenosis or regurgitation seen. Pericardium Normal pericardium without effusion. Aorta Ascending aorta not well visualized. Salvatore ALBARADO To (Electronically Signed) Final Date:     2019                 16:52    Us-extremity Artery Lower Unilat W/steve (combo) Left    Result Date: 2019   Vascular Laboratory  Conclusions  KENDRICK ATWOOD  Age:    53    Gender:     M  MRN:    [id number]  :    1965      BSA:  Exam Date:     2019 08:10  Room #:     Inpatient  Priority:     Routine  Ht (in):             Wt (lb):  Ordering Physician:        VALERIE AVALOS  Referring Physician:       [id number] Underwood  Sonographer:               Moralse Suggs RVT, RDMS  Study Type:                Complete Bilateral  Technical Quality:         Adequate  Indications:     Atherosclerosis of native arteries of left leg with                   ulceration of unspecified site  CPT Codes:       87997  ICD Codes:       I70.249  History:         Nonhealing wound of left lower extremity  Limitations:     Unable to obtain  "right brachial pressure due to line in arm,                    Unable to obtain left popliteal waveform due to wound                   dressing                 RIGHT  Waveform            Systolic BPs (mmHg)                                           Brachial  Triphasic                                Common Femoral  Triphasic                  135           Posterior Tibial  Triphasic                  137           Dorsalis Pedis                                           Peroneal                             1.18          DIANE                                           TBI                       LEFT  Waveform        Systolic BPs (mmHg)                             116           Brachial  Triphasic                                Common Femoral  Triphasic                  132           Posterior Tibial  Triphasic                  134           Dorsalis Pedis                                           Peroneal                             1.16          DIANE                                           TBI  Findings  Bilateral.  Doppler waveform of the common femoral artery is of high amplitude and  triphasic.  Doppler waveforms at the ankle are brisk and triphasic.  Ankle-brachial index is normal.  Additional testing was not performed in accordance with lower extremity  arterial evaluation protocol.       Micro:  Results     Procedure Component Value Units Date/Time    BLOOD CULTURE [[id number]] Collected:  12/17/19 0327    Order Status:  Completed Specimen:  Blood from Peripheral Updated:  12/22/19 0700     Significant Indicator NEG     Source BLD     Site PERIPHERAL     Culture Result No growth after 5 days of incubation.    Narrative:       Per Hospital Policy: Only change Specimen Src: to \"Line\" if  specified by physician order.  Left AC    BLOOD CULTURE [[id number]] Collected:  12/17/19 0332    Order Status:  Completed Specimen:  Blood from Peripheral Updated:  12/22/19 0700     Significant Indicator NEG     Source BLD     Site " "PERIPHERAL     Culture Result No growth after 5 days of incubation.    Narrative:       Per Hospital Policy: Only change Specimen Src: to \"Line\" if  specified by physician order.  Right Forearm/Arm    CULTURE WOUND W/ GRAM STAIN [[id number]]  (Abnormal) Collected:  12/18/19 1219    Order Status:  Completed Specimen:  Wound Updated:  12/21/19 1535     Significant Indicator POS     Source WND     Site Left thigh wound     Culture Result Growth noted after further incubation, see below for  organism identification.       Gram Stain Result Few WBCs.  No organisms seen.       Culture Result Viridans Streptococcus  Rare growth      Narrative:       Surgery - swabs received    Anaerobic Culture [[id number]] Collected:  12/18/19 1219    Order Status:  Completed Specimen:  Wound Updated:  12/21/19 1535     Significant Indicator NEG     Source WND     Site Left thigh wound     Culture Result No Anaerobes isolated.    Narrative:       Surgery - swabs received    BLOOD CULTURE [[id number]] Collected:  12/15/19 1151    Order Status:  Completed Specimen:  Blood from Peripheral Updated:  12/20/19 1300     Significant Indicator NEG     Source BLD     Site PERIPHERAL     Culture Result No growth after 5 days of incubation.    Narrative:       1 of 2 for Blood Culture x 2 sites order. Per Hospital  Policy: Only change Specimen Src: to \"Line\" if specified by  physician order.  Left Forearm/Arm    BLOOD CULTURE [[id number]]  (Abnormal)  (Susceptibility) Collected:  12/15/19 1130    Order Status:  Completed Specimen:  Blood from Peripheral Updated:  12/19/19 1022     Significant Indicator POS     Source BLD     Site PERIPHERAL     Culture Result Growth detected by Bactec instrument. 12/16/2019  10:18  Staphylococcus aureus (methicillin sensitive)  detected by PCR.        Staphylococcus aureus    Narrative:       CALL  Dsouza  MS5 tel. [phone],  CALLED  MS5 tel. [phone] 12/16/2019, 10:24, called [phone] Dionne  2 of 2 blood culture x2  Sites " "order. Per Hospital Policy:  Only change Specimen Src: to \"Line\" if specified by physician  order.  No site indicated    Susceptibility     Staphylococcus aureus (1)     Antibiotic Interpretation Microscan Method Status    Clindamycin Sensitive <=0.5 mcg/mL ANTONIO Final    Daptomycin Sensitive <=0.5 mcg/mL ANTONIO Final    Erythromycin Sensitive <=0.5 mcg/mL ANTONIO Final    Moxifloxacin Sensitive 1 mcg/mL ANTONIO Final    Ampicillin/sulbactam Sensitive <=8/4 mcg/mL ANTONIO Final    Oxacillin Sensitive <=0.25 mcg/mL ANTONIO Final    Vancomycin Sensitive 1 mcg/mL ANTONIO Final    Penicillin Resistant 2 mcg/mL ANTONIO Final    Trimeth/Sulfa Sensitive <=0.5/9.5 mcg/mL ANTONIO Final    Tetracycline Sensitive <=4 mcg/mL ANTONIO Final                   GRAM STAIN [[id number]] Collected:  12/18/19 1219    Order Status:  Completed Specimen:  Wound Updated:  12/18/19 1409     Significant Indicator .     Source WND     Site Left thigh wound     Gram Stain Result Few WBCs.  No organisms seen.      Narrative:       Surgery - swabs received    Culture Wound W/ Gram Stain [[id number]]  (Abnormal)  (Susceptibility) Collected:  12/15/19 1115    Order Status:  Completed Specimen:  Wound from Left Leg Updated:  12/17/19 0859     Significant Indicator POS     Source WND     Site LEFT LEG     Culture Result -     Gram Stain Result Moderate WBCs.  Many Gram positive cocci.       Culture Result Beta Hemolytic Streptococcus group A  Heavy growth        Staphylococcus aureus  Moderate growth      Narrative:       CALL  Dsouza  MS5 tel. [phone],  CALLED  MS5 tel. [phone] 12/16/2019, 15:13, RB PERF. RESULTS CALLED  TO:Maryann [id number] Rn    Susceptibility     Staphylococcus aureus (2)     Antibiotic Interpretation Microscan Method Status    Clindamycin Sensitive <=0.5 mcg/mL ANTONIO Final    Daptomycin Sensitive <=0.5 mcg/mL ANTONIO Final    Erythromycin Sensitive <=0.5 mcg/mL ANTNOIO Final    Moxifloxacin Sensitive 1 mcg/mL ATNONIO Final    Ampicillin/sulbactam Sensitive <=8/4 mcg/mL ANTONIO Final    " Oxacillin Sensitive <=0.25 mcg/mL ANTONIO Final    Vancomycin Sensitive 1 mcg/mL ANTONIO Final    Penicillin Resistant 2 mcg/mL ANTONIO Final    Trimeth/Sulfa Sensitive <=0.5/9.5 mcg/mL ANTONIO Final    Tetracycline Sensitive <=4 mcg/mL ANTONIO Final                   GRAM STAIN [[id number]] Collected:  12/15/19 1115    Order Status:  Completed Specimen:  Wound Updated:  12/15/19 2120     Significant Indicator .     Source WND     Site LEFT LEG     Gram Stain Result Moderate WBCs.  Many Gram positive cocci.            Assessment:  Active Hospital Problems    Diagnosis   • *Wound infection, left inner thigh [T14.8XXA, L08.9]   • Bacteremia due to methicillin resistant Staphylococcus aureus [R78.81]   • LLE Cellulitis [L03.90]   • Heroin abuse ([facility]) [F11.10]       ASSESSMENT/PLAN:      Jaime Gunderson is a 53 y.o.  admitted 12/15/2019. Pt has a past medical history of injury to left lower leg with chronic nonhealing wound as well as active IV drug use with heroin.  He has a history of injecting into veins of right arm as well as left leg.  He states he had a recent abscess on his right arm which he cut open himself and drained and it is healed.  He then had a similar appearance on his left thigh with edema, erythema and drainage at injection site.  He then used a razor blade and cut open the wound and attempt to drain it.  However, the left thigh had increasing erythema pain and drainage.  His symptoms been ongoing for approximately 7 days and due to worsening wound on his left leg he presented to the ER.     Hospital Course:   The patient is been afebrile and no significant leukocytosis.  Blood cultures on 12/15 1/2+ for MSSA.  See below for details.  He was admitted and started on vancomycin and Unasyn.  Now transitioned to cefazolin.      MSSA bacteremia, on treatment  Secondary to IV drug use  Leukocytosis resolved  - Blood cultures on 12/15 2/2+ for MSSA. Per PCR  - Blood cultures on 12/17 2/2- NGTD  -TTE on 12/18 with no  vegetation  -BAILEY ordered and pending - unable to be performed on 12/20 per discussion with cardiology.   Continue cefazolin 2 g every 8 hours  Pt blood cultures cleared quickly and source is 2/2 below  Will plan on 4 weeks of IV abx from 12/17.  Stop date 01/14/2020     Left thigh abscess, on treatment  Left lower leg abscess   -2/2 IV drug use  -CT left leg on 12/15 with edema, no osteomyelitis, no abscess  -Wound cultures on 12/15 with group A strep and MSSA  Culture on 12/18 + viridans Streptococcus    IV drug use, active, heroin  -HIV screen negative on 12/16  -Acute hepatitis panel negative on 12/15 with exception of positive hepatitis C antibody  Not a candidate for PICC line for home or outpatient antibiotics.  Will need a monitored setting  Advised cessation     Injury to lumbar spine, chronic back pain   -MRI lumbar spine with contrast on 12/16 with no evidence of discitis/osteomyelitis or abnormal fluid collection.  -OR on 12/18 for I&D of left thigh abscess, cultures obtained    Chronic hepatitis C infection  - Known hepatitis C antibody positive, HCV quant [phone] on 12/18   Not a candidate for treatment at this time given IV drug use  Patient will eventually need to follow-up for outpatient treatment for his hepatitis C    Disposition:  Pt has been accepted to [facility].  Transfer likely Monday.  [facility] infectious disease does not go to [facility].  If infectious disease services are needed, please consult [facility] infectious disease.    Plan of care discussed with Tona THOMAS. Will sign off.  Please reconsult if needed

## 2019-12-22 NOTE — PROGRESS NOTES
Received change of shift report from day-shift RN and assumed care of patient at 1900. Assessment performed. Patient is alert and oriented x4. Pt states he had wound dressings and PICC line dressings changed today during the day.  Patient call light within reach, personal possessions nearby, bed in low position and locked, hourly rounding in practice, and non-skid socks in place.

## 2019-12-22 NOTE — PROGRESS NOTES
Assumed care of pt at 0745. Report received and bedside rounding completed with night RN. Pt is calm no SOB, or in any acute distress noted.    Pt is not considered a fall risk. edu provided on risk level.   Fall precautions in place,  bed alarm. - Treaded non slip socks. Bed locked. Communication board updated with POC. Call light and pt belongings within reach - pt makes needs known - hourly rounding in place. See flowsheets for further assessment.

## 2019-12-22 NOTE — PROGRESS NOTES
"   Orthopaedic Progress Note    Interval changes:  Patient doing well  Dressings changed- incisions without issue    ROS - Patient denies any new issues.  Pain well controlled.    /72   Pulse 72   Temp 36.4 °C (97.5 °F) (Temporal)   Resp 18   Ht 1.88 m (6' 2\")   Wt 94.3 kg (207 lb 14.3 oz)   SpO2 94%       Patient seen and examined  No acute distress  Breathing non labored  RRR  LLE dressings changed, surgical incisions are well approximated and are dry and clean.  There is no erythema, induration, or signs of active progressing infection at any of the incision sites.  LLE DNVI, moves all toes, cap refill <2 sec.     Recent Labs     12/19/19  0321 12/20/19  0258 12/21/19  0555   WBC 16.6* 15.7* 10.6   RBC 5.12 4.83 4.95   HEMOGLOBIN 15.0 14.2 14.5   HEMATOCRIT 45.7 43.9 44.5   MCV 89.3 90.9 89.9   MCH 29.3 29.4 29.3   MCHC 32.8* 32.3* 32.6*   RDW 43.5 45.2 46.5   PLATELETCT 381 324 320   MPV 9.9 9.7 9.9       Active Hospital Problems    Diagnosis   • Bacteremia due to methicillin resistant Staphylococcus aureus [R78.81]     Priority: High   • LLE Cellulitis [L03.90]     Priority: High   • Wound infection, left inner thigh [T14.8XXA, L08.9]     Priority: High   • Chronic hepatitis C virus infection (HCC) [B18.2]     Priority: Low   • Heroin abuse (HCC) [F11.10]     Priority: Low       Assessment/Plan:  Cleared for DC by ortho pending medicine and ID team clearance  POD#3 Irrigation and debridement, left medial thigh abscess  Wt bearing status - wbat  Wound care/Drains - dressings changed every other day by nursing  Future Procedures - none planned   Lovenox: Start 12/15, Duration-until ambulatory > 150'  Sutures/Staples out- 10-14 days post operatively  PT/OT-initiated  Antibiotics: ancef 2g IV q8  DVT Prophylaxis- TEDS/SCDs/Foot pumps  Stallworth-none  Case Coordination for Discharge Planning - Disposition pending abx needs    "

## 2019-12-22 NOTE — PROGRESS NOTES
Hospital Medicine Daily Progress Note    Date of Service  12/22/2019    Chief Complaint  Wound check    Hospital Course   Mr. Gunderson is a 54 y/o male who presented to the ED on 12/15/19 with a possible abscess to his right forearm in addition to 2 large open wounds on the inside of his left thigh with a large amount of purulent drainage where he has been injecting heroin. He was told he would need medical clearance prior to undergoing rehab through [facility]. A CT scan was done and showed extensive soft tissue induration and/or edema throughout the subcutaneous fat and intramuscular fat planes of the left lower extremity which could be due to cellulitis or edema. There was no appreciable abscess or evidence of osteomyelitis. Wound culture was positive for group A strep and MSSA, and his blood cultures were also positive for MSSA in 1 out of 2 bottles. An echocardiogram, wound consult, and lumbar spine MRI were subsequently ordered. The MRI & TTE were negative. Cardiology was then consulted on 12/18/19 for BAILEY, but refused to do it, as they felt it wasn't warranted. An US of the LE was also done and had no evidence of arterial insufficiency. The wound care RN also recommended a surgical consult for possible I&D, which was performed on 12/18/19 by Dr. Ward. A PICC line was placed on 12/20/19. His anticipated antibiotic end date is now 1/14/2020.         Interval Problem Update  Surgical bandage has been removed, replaced by gauze & tegaderm bandages that are clean, dry, & intact. Pain is currently 5/10. Otherwise feels good. No complaints. No w/d s/s today.     POD #4 from I&D. Intraoperative cultures positive for S. Viridans.   12/17/19 BC finalized needed.    No labs checked today, as labs yesterday were normal.    Afebrile overnight, HR 60s-70s, SBP 100s-teens, O2 sats WNL on room air.     Consultants/Specialty  Infectious disease  Cardiology  Orthopedic surgery    Code Status  Full  code    Disposition   Medically clear for transfer to LTAC when bed available, likely tomorrow.   Will need suture removal in 2 weeks (appox 1/1/2020).     Review of Systems  Review of Systems   Constitutional: Negative for chills, fever and malaise/fatigue.   Respiratory: Negative for cough, shortness of breath and wheezing.    Cardiovascular: Negative for chest pain, palpitations and leg swelling.   Gastrointestinal: Negative for abdominal pain, constipation, diarrhea, nausea and vomiting.   Genitourinary: Negative for dysuria, frequency and urgency.   Skin:        + multiple wounds throughout his body (left anterior shin, right forearm, left upper medial thigh, left lower anterior medial shin); purulent drainage noted from inner thigh wounds  Surgical wrap remains in place   Neurological: Negative for sensory change, speech change, focal weakness, seizures and weakness.   Psychiatric/Behavioral: Positive for substance abuse (IV heroin). Negative for depression and hallucinations. The patient is not nervous/anxious and does not have insomnia.    All other systems reviewed and are negative.     Physical Exam  Temp:  [36.4 °C (97.5 °F)-36.7 °C (98.1 °F)] 36.7 °C (98.1 °F)  Pulse:  [64-74] 64  Resp:  [16-18] 16  BP: (102-143)/(60-72) 117/72  SpO2:  [94 %-99 %] 99 %    Physical Exam  Vitals signs and nursing note reviewed.   Constitutional:       General: He is awake. He is not in acute distress.     Appearance: Normal appearance. He is well-developed and overweight. He is not ill-appearing.   HENT:      Head: Normocephalic and atraumatic.      Mouth/Throat:      Lips: Pink.      Mouth: Mucous membranes are moist.   Eyes:      Conjunctiva/sclera: Conjunctivae normal.      Pupils: Pupils are equal, round, and reactive to light.   Neck:      Musculoskeletal: Normal range of motion and neck supple.   Cardiovascular:      Rate and Rhythm: Normal rate and regular rhythm.      Pulses: Normal pulses.      Heart sounds:  Normal heart sounds.   Pulmonary:      Effort: Pulmonary effort is normal.      Breath sounds: Normal breath sounds.   Abdominal:      General: Bowel sounds are normal. There is no distension.      Palpations: Abdomen is soft.      Tenderness: There is no tenderness.   Musculoskeletal:      Lumbar back: He exhibits no tenderness, no bony tenderness and no pain.      Right lower leg: No edema.      Left lower leg: No edema (resolved with elevation of the limb).   Skin:     General: Skin is warm and dry.      Findings: Abscess, signs of injury and wound present. No erythema.          Neurological:      General: No focal deficit present.      Mental Status: He is alert and oriented to person, place, and time.      GCS: GCS eye subscore is 4. GCS verbal subscore is 5. GCS motor subscore is 6.      Cranial Nerves: Cranial nerves are intact.      Sensory: Sensation is intact.      Motor: Motor function is intact.      Coordination: Coordination is intact.      Gait: Gait is intact.   Psychiatric:         Attention and Perception: Attention and perception normal.         Mood and Affect: Mood and affect normal.         Speech: Speech normal.         Behavior: Behavior normal. Behavior is cooperative.         Thought Content: Thought content normal.         Cognition and Memory: Cognition and memory normal.         Judgment: Judgment normal.      Comments: Pleasant & cooperative.      Fluids    Intake/Output Summary (Last 24 hours) at 12/22/2019 1506  Last data filed at 12/22/2019 1100  Gross per 24 hour   Intake 440 ml   Output no documentation   Net 440 ml     Laboratory  Recent Labs     12/20/19  0258 12/21/19  0555   WBC 15.7* 10.6   RBC 4.83 4.95   HEMOGLOBIN 14.2 14.5   HEMATOCRIT 43.9 44.5   MCV 90.9 89.9   MCH 29.4 29.3   MCHC 32.3* 32.6*   RDW 45.2 46.5   PLATELETCT 324 320   MPV 9.7 9.9     Recent Labs     12/20/19  0258 12/20/19  1034 12/21/19  0555   SODIUM 144  --  137   POTASSIUM 5.9* 5.1 4.1   CHLORIDE 109   --  105   CO2 25  --  26   GLUCOSE 114*  --  96   BUN 23*  --  21   CREATININE 0.95  --  0.66   CALCIUM 9.4  --  8.8     Imaging  IR-PICC LINE PLACEMENT W/ GUIDANCE > AGE 5   Final Result                  Ultrasound-guided PICC placement performed by qualified nursing staff as    above.          EC-ECHOCARDIOGRAM COMPLETE W/O CONT   Final Result      MR-LUMBAR SPINE-WITH & W/O   Final Result      1.  No evidence of discitis/osteomyelitis or abnormal fluid collection.   2.  Congenital/developmental narrowing of the lumbar spinal canal.   3.  L2-S1 degenerative changes as detailed above.      US-DIANE SINGLE LEVEL BILAT   Final Result      US-EXTREMITY ARTERY LOWER UNILAT W/DIANE (COMBO) LEFT         CT-EXTREMITY, LOWER WITH LEFT   Final Result      1.  Extensive soft tissue induration and/or edema is identified throughout the subcutaneous fat and intramuscular fat planes of the left lower extremity. Findings could be due to cellulitis or edema.      2.  No soft tissue abscess is appreciated.      3.  No bone erosion is identified that would suggest osteomyelitis.      4.  Prominent dilated superficial veins are identified likely related to congestion inflammation or infection.      5.  Enlarged left inguinal lymph node is identified likely due to inflammation or infection.      EC-BAILEY W/O CONT    (Results Pending)      Assessment/Plan  * Wound infection, left inner thigh- (present on admission)  Assessment & Plan  -CT scan negative for abscess or osteomyelitis.   -S/p surgical I&D by Dr. Ward on 12/18/2018.  Intraoperative cultures positive for S. viridans. No further surgical plans per orthopedic surgery. Sutures to be removed in 2 weeks (appox 1/1/2020).   -ID following.  Continue antibiotics per their recommendations.  -Wound culture + MSSA & group A strep.   -Continue wound care.     Bacteremia due to methicillin resistant Staphylococcus aureus- (present on admission)  Assessment & Plan  -ID following.  -TTE  negative.  Cardiology doesn't feel BAILEY is warranted.   -MRI lumbar spine with contrast ordered due to L-spine pain & point tenderness, was negative for abscess, diskitis, osteo.   -Continuing IV ancef per ID recs, end date 1/14/2020.  -Repeat blood cultures drawn 12/17/2019 finalized negative. PICC line placed 12/20/19.      Chronic hepatitis C virus infection (HCC)- (present on admission)  Assessment & Plan  -Recommend outpatient follow up.     Heroin abuse (HCC)- (present on admission)  Assessment & Plan  -Continue to reiterate the importance of cessation.   -Withdrawal symptoms controlled on twice daily methadone.  Continue to reassess.     VTE prophylaxis: Lovenox      Electronically signed by:  Tona Caba, MSN, RN, APRN, ACNPC-AG, CCRN  Nurse Practitioner, [facility]  Work # [phone]  Cell # [phone] (call, text, or tiger text)    12/22/2019    3:07 PM

## 2019-12-22 NOTE — CARE PLAN
Problem: Infection  Goal: Will remain free from infection  Outcome: PROGRESSING AS EXPECTED  Intervention: Assess for removal of potential routes of infection, such as IV, central line, intra-arterial or urinary catheters  Note:   Pt provided with wound care and PICC line dressing change during day shift today.      Problem: Pain Management  Goal: Pain level will decrease to patient's comfort goal  Outcome: PROGRESSING AS EXPECTED  Intervention: Follow pain managment plan developed in collaboration with patient and Interdisciplinary Team  Note:   Pt denies any pain at this time.

## 2019-12-22 NOTE — PROGRESS NOTES
"   Orthopaedic Progress Note    Interval changes:  Patient doing well  Dressings CDI    ROS - Patient denies any new issues.  Pain well controlled.    /72   Pulse 64   Temp 36.7 °C (98.1 °F) (Temporal)   Resp 16   Ht 1.88 m (6' 2\")   Wt 94.3 kg (207 lb 14.3 oz)   SpO2 99%       Patient seen and examined  No acute distress  Breathing non labored  RRR  LLE dressings CDI, DNVI, moves all toes, cap refill <2 sec.     Recent Labs     12/20/19  0258 12/21/19  0555   WBC 15.7* 10.6   RBC 4.83 4.95   HEMOGLOBIN 14.2 14.5   HEMATOCRIT 43.9 44.5   MCV 90.9 89.9   MCH 29.4 29.3   MCHC 32.3* 32.6*   RDW 45.2 46.5   PLATELETCT 324 320   MPV 9.7 9.9       Active Hospital Problems    Diagnosis   • Bacteremia due to methicillin resistant Staphylococcus aureus [R78.81]     Priority: High   • Wound infection, left inner thigh [T14.8XXA, L08.9]     Priority: High   • Chronic hepatitis C virus infection (HCC) [B18.2]     Priority: Low   • Heroin abuse (HCC) [F11.10]     Priority: Low       Assessment/Plan:  Cleared for DC by ortho pending medicine and ID team clearance  POD#4 Irrigation and debridement, left medial thigh abscess  Wt bearing status - wbat  Wound care/Drains - dressings changed every other day by nursing  Future Procedures - none planned   Lovenox: Start 12/15, Duration-until ambulatory > 150'  Sutures/Staples out- 10-14 days post operatively  PT/OT-initiated  Antibiotics: ancef 2g IV q8  DVT Prophylaxis- TEDS/SCDs/Foot pumps  Stallworth-none  Case Coordination for Discharge Planning - Disposition pending abx needs    "

## 2019-12-23 VITALS
HEART RATE: 81 BPM | WEIGHT: 207.89 LBS | DIASTOLIC BLOOD PRESSURE: 55 MMHG | RESPIRATION RATE: 18 BRPM | HEIGHT: 74 IN | BODY MASS INDEX: 26.68 KG/M2 | OXYGEN SATURATION: 93 % | TEMPERATURE: 96.8 F | SYSTOLIC BLOOD PRESSURE: 101 MMHG

## 2019-12-23 PROBLEM — R78.81 BACTEREMIA DUE TO METHICILLIN RESISTANT STAPHYLOCOCCUS AUREUS: Status: RESOLVED | Noted: 2019-12-16 | Resolved: 2019-12-23

## 2019-12-23 PROBLEM — B95.62 BACTEREMIA DUE TO METHICILLIN RESISTANT STAPHYLOCOCCUS AUREUS: Status: RESOLVED | Noted: 2019-12-16 | Resolved: 2019-12-23

## 2019-12-23 PROCEDURE — 700111 HCHG RX REV CODE 636 W/ 250 OVERRIDE (IP): Performed by: HOSPITALIST

## 2019-12-23 PROCEDURE — A9270 NON-COVERED ITEM OR SERVICE: HCPCS | Performed by: NURSE PRACTITIONER

## 2019-12-23 PROCEDURE — 700102 HCHG RX REV CODE 250 W/ 637 OVERRIDE(OP): Performed by: NURSE PRACTITIONER

## 2019-12-23 PROCEDURE — A9270 NON-COVERED ITEM OR SERVICE: HCPCS | Performed by: HOSPITALIST

## 2019-12-23 PROCEDURE — 99239 HOSP IP/OBS DSCHRG MGMT >30: CPT | Performed by: INTERNAL MEDICINE

## 2019-12-23 PROCEDURE — 700102 HCHG RX REV CODE 250 W/ 637 OVERRIDE(OP): Performed by: HOSPITALIST

## 2019-12-23 RX ORDER — CEFAZOLIN SODIUM 2 G/100ML
2 INJECTION, SOLUTION INTRAVENOUS EVERY 8 HOURS
Start: 2019-12-23 | End: 2020-01-14

## 2019-12-23 RX ORDER — METHADONE HYDROCHLORIDE 10 MG/1
10 TABLET ORAL 2 TIMES DAILY
Qty: 10 TAB | Refills: 0 | Status: SHIPPED | OUTPATIENT
Start: 2019-12-23 | End: 2019-12-28

## 2019-12-23 RX ORDER — OXYCODONE HYDROCHLORIDE 5 MG/1
5 TABLET ORAL EVERY 6 HOURS PRN
Qty: 12 TAB | Refills: 0 | Status: SHIPPED | OUTPATIENT
Start: 2019-12-23 | End: 2019-12-26

## 2019-12-23 RX ADMIN — ENOXAPARIN SODIUM 40 MG: 100 INJECTION SUBCUTANEOUS at 05:20

## 2019-12-23 RX ADMIN — METHADONE HYDROCHLORIDE 10 MG: 10 TABLET ORAL at 17:03

## 2019-12-23 RX ADMIN — OXYCODONE HYDROCHLORIDE 10 MG: 10 TABLET ORAL at 01:32

## 2019-12-23 RX ADMIN — METHADONE HYDROCHLORIDE 10 MG: 10 TABLET ORAL at 05:20

## 2019-12-23 RX ADMIN — SENNOSIDES AND DOCUSATE SODIUM 2 TABLET: 8.6; 5 TABLET ORAL at 05:20

## 2019-12-23 RX ADMIN — OXYCODONE HYDROCHLORIDE 10 MG: 10 TABLET ORAL at 17:03

## 2019-12-23 RX ADMIN — CEFAZOLIN SODIUM 2 G: 2 INJECTION, SOLUTION INTRAVENOUS at 05:20

## 2019-12-23 RX ADMIN — CEFAZOLIN SODIUM 2 G: 2 INJECTION, SOLUTION INTRAVENOUS at 12:54

## 2019-12-23 RX ADMIN — OXYCODONE HYDROCHLORIDE 10 MG: 10 TABLET ORAL at 11:04

## 2019-12-23 NOTE — PROGRESS NOTES
"Received alert and oriented x 4. Check vitals sign and recorded accordingly and due med given per MAR. Monitor sign and symptoms of respiratory distress and treatment given accordingly per MAR.Medicated per MAR and reassessed every 2 hours per protocol. Call light within reach.Up self. Needs attended. Will continue to monitor./72   Pulse 86   Temp 36.4 °C (97.5 °F) (Temporal)   Resp 17   Ht 1.88 m (6' 2\")   Wt 94.3 kg (207 lb 14.3 oz)   SpO2 97%   BMI 26.69 kg/m² .  "

## 2019-12-23 NOTE — DISCHARGE SUMMARY
Discharge Summary    CHIEF COMPLAINT ON ADMISSION  Chief Complaint   Patient presents with   • Wound Check     Reason for Admission  Wound check    CODE STATUS  Full Code    HPI & HOSPITAL COURSE  Mr. Gunderson is a very pleasant 54 y/o male who presented to the ED on 12/15/19 with a possible abscess to his right forearm in addition to 2 large open wounds on the inside of his left thigh with a large amount of purulent drainage where he has been injecting heroin. He was told he would need medical clearance prior to undergoing rehab through [facility]. A CT scan was done and showed extensive soft tissue induration and/or edema throughout the subcutaneous fat and intramuscular fat planes of the left lower extremity which could be due to cellulitis or edema. There was no appreciable abscess or evidence of osteomyelitis. Wound culture was positive for group A strep and MSSA, and his blood cultures were also positive for MSSA in 1 out of 2 bottles. An echocardiogram, wound consult, and lumbar spine MRI were subsequently ordered. The MRI & TTE were negative. Cardiology was then consulted on 12/18/19 for BAILEY, but refused to do it, as they felt it wasn't warranted. An US of the LE was also done and had no evidence of arterial insufficiency. The wound care RN recommended a surgical consult for possible I&D, which was performed on 12/18/19 by Dr. Ward. Intraop cultures were positive for S. Viridans. A PICC line was placed on 12/20/19. Repeat blood cultures were done on 12/17/19 and finalized negative. His antibiotic end date is now 1/14/2020.  On the day of discharge his vital signs and lab work are stable, his pain is controlled, and his withdrawal symptoms are nonexistent on methadone. Of additional note, his sutures should be removed on 1/1/2020 per orthopedic surgery.      Therefore, he is discharged in good and stable condition to a long-term acute care hospital.    The patient met 2-midnight  criteria for an inpatient stay at the time of discharge.    FOLLOW UP ITEMS POST DISCHARGE  PCP 1-2 weeks after discharge from LTAC.     DISCHARGE DIAGNOSES  Principal Problem:    Wound infection, left inner thigh POA: Yes  Active Problems:    Bacteremia due to methicillin resistant Staphylococcus aureus POA: Yes    Heroin abuse (HCC) POA: Yes    Chronic hepatitis C virus infection (HCC) POA: Yes  Resolved Problems:    LLE Cellulitis POA: Yes    Hyponatremia POA: Yes    FOLLOW UP  No future appointments.  No follow-up provider specified.    MEDICATIONS ON DISCHARGE     Medication List      Start taking these medications      Instructions   ceFAZolin in dextrose 2 g/100mL IVPB  Commonly known as:  ANCEF   100 mL by Intravenous route every 8 hours for 22 days.  Dose:  2 g     methadone 10 MG Tabs  Commonly known as:  DOLOPHINE   Take 1 Tab by mouth 2 Times a Day for 5 days.  Dose:  10 mg     oxyCODONE immediate-release 5 MG Tabs  Commonly known as:  ROXICODONE   Take 1 Tab by mouth every 6 hours as needed for Severe Pain for up to 3 days.  Dose:  5 mg          Allergies  No Known Allergies    DIET  Orders Placed This Encounter   Procedures   • Diet Order Regular (pt birthday today pls bring something for him today breakfast)     Standing Status:   Standing     Number of Occurrences:   1     Order Specific Question:   Diet:     Answer:   Regular [1]     Comments:   pt birthday today pls bring something for him today breakfast     ACTIVITY  As tolerated and directed by rehab.  Weight bearing as tolerated    LINES, DRAINS, AND WOUNDS  This is an automated list. Peripheral IVs will be removed prior to discharge.  PICC Single Lumen 12/20/19 Left Cephalic (Active)   Site Assessment Clean;Dry;Intact 12/23/2019  8:30 AM   Line Status Flushed;Scrubbed the hub prior to access;Infusing 12/23/2019  8:30 AM   Line Secured at (cm) 0 cm 12/20/2019  1:09 PM   Extremity Circumference (cm) 33 cm 12/20/2019  1:09 PM   Dressing Type  Biopatch;Occlusive;Securing device;Skin barrier 12/23/2019  8:30 AM   Dressing Status Clean;Dry;Intact 12/23/2019  8:30 AM   Dressing Intervention N/A 12/23/2019  8:30 AM   Dressing Change Due 12/28/19 12/23/2019  8:30 AM   Date Primary Tubing Changed 12/21/19 12/23/2019  8:30 AM   Date Secondary Tubing Changed 12/23/19 12/23/2019  8:30 AM   Date IV Connector(s) Changed 12/21/19 12/23/2019  8:30 AM   NEXT Primary Tubing Change  12/24/19 12/23/2019  8:30 AM   NEXT Secondary Tubing Change  12/24/19 12/23/2019  8:30 AM   NEXT IV Connector(s) Change 12/28/19 12/23/2019  8:30 AM       Wound 12/16/19 Full Thickness Wound Leg left medial thigh (Active)   Wound Image   12/16/2019  8:19 AM   Site Assessment DA 12/23/2019  8:30 AM   Violetta-wound Assessment DA 12/23/2019  8:30 AM   Margins DA 12/23/2019  8:30 AM   Wound Length (cm) 2 cm 12/17/2019  2:00 PM   Wound Width (cm) 3 cm 12/17/2019  2:00 PM   Wound Depth (cm) 2 cm 12/17/2019  2:00 PM   Wound Surface Area (cm^2) 6 cm^2 12/17/2019  2:00 PM   Closure None 12/23/2019  8:30 AM   Drainage Amount DA 12/23/2019  8:30 AM   Drainage Description DA 12/23/2019  8:30 AM   Non-staged Wound Description Full thickness 12/23/2019  8:30 AM   Treatments Cleansed;Site care 12/18/2019  6:15 AM   Cleansing Approved Wound Cleanser 12/23/2019  8:30 AM   Periwound Protectant Not Applicable 12/23/2019  8:30 AM   Dressing Cleansing/Solutions 1/4 Strength Dakin's Solution 12/23/2019  8:30 AM   Dressing Changed Changed 12/18/2019  6:15 AM   Dressing Status Clean;Dry;Intact 12/23/2019  8:30 AM   Dressing Change Frequency Every 48 hrs 12/23/2019  8:30 AM   NEXT Dressing Change  12/24/19 12/23/2019  8:30 AM   NEXT Weekly Photo (Inpatient Only) 12/25/19 12/18/2019  6:15 AM   WOUND NURSE ONLY - Odor Mild 12/17/2019  2:00 PM   WOUND NURSE ONLY - Exposed Structures None 12/17/2019  2:00 PM   WOUND NURSE ONLY - Tissue Type and Percentage 100% tan/yellow slough 12/17/2019  2:00 PM   WOUND NURSE ONLY -  Time Spent with Patient (mins) 60 12/17/2019  2:00 PM       Wound 12/15/19 Full Thickness Wound Wrist RIGHT MEDIAL (Active)   Wound Image   12/17/2019  2:00 PM   Site Assessment Dry;Intact 12/23/2019  8:30 AM   Violetta-wound Assessment Red 12/23/2019  8:30 AM   Margins Attached edges 12/23/2019  8:30 AM   Post Wound Length (cm) 1.1 cm 12/17/2019  2:00 PM    Post Wound Width (cm) 0.7 cm 12/17/2019  2:00 PM   Post Wound Depth (cm) 0.2 cm 12/17/2019  2:00 PM   Post Wound Surface Area (cm^2) 0.77 cm^2 12/17/2019  2:00 PM   Tunneling 0 cm 12/17/2019  2:00 PM   Undermining 0 cm 12/17/2019  2:00 PM   Closure Secondary intention 12/23/2019  8:30 AM   Drainage Amount None 12/23/2019  8:30 AM   Drainage Description Serosanguineous 12/23/2019  8:30 AM   Non-staged Wound Description Full thickness 12/23/2019  8:30 AM   Treatments Cleansed;Site care 12/17/2019  2:00 PM   Cleansing Normal Saline Irrigation 12/23/2019  8:30 AM   Periwound Protectant Not Applicable 12/23/2019  8:30 AM   Dressing Options Dry Gauze 12/23/2019  8:30 AM   Dressing Cleansing/Solutions Not Applicable 12/23/2019  8:30 AM   Dressing Changed Changed 12/21/2019  9:36 PM   Dressing Status Clean;Dry;Intact 12/23/2019  8:30 AM   Dressing Change Frequency Every 48 hrs 12/23/2019  8:30 AM   NEXT Dressing Change  12/24/19 12/23/2019  8:30 AM   NEXT Weekly Photo (Inpatient Only) 12/25/19 12/22/2019  8:00 PM   WOUND NURSE ONLY - Odor None 12/17/2019  2:00 PM   WOUND NURSE ONLY - Exposed Structures None 12/17/2019  2:00 PM   WOUND NURSE ONLY - Tissue Type and Percentage 100% red/pink 12/17/2019  2:00 PM       Wound 12/15/19 Full Thickness Wound Tibia left shin (Active)   Wound Image   12/17/2019  2:00 PM   Site Assessment DA 12/23/2019  8:30 AM   Violetta-wound Assessment DA 12/23/2019  8:30 AM   Margins DA 12/23/2019  8:30 AM   Post Wound Length (cm) 3.2 cm 12/17/2019  2:00 PM    Post Wound Width (cm) 0.9 cm 12/17/2019  2:00 PM   Post Wound Depth (cm) 0.2 cm 12/17/2019   2:00 PM   Post Wound Surface Area (cm^2) 2.88 cm^2 12/17/2019  2:00 PM   Tunneling 0 cm 12/17/2019  2:00 PM   Undermining 0 cm 12/17/2019  2:00 PM   Closure None 12/23/2019  8:30 AM   Drainage Amount DA 12/23/2019  8:30 AM   Drainage Description DA 12/23/2019  8:30 AM   Non-staged Wound Description Full thickness 12/23/2019  8:30 AM   Treatments Cleansed;Site care 12/17/2019  2:00 PM   Cleansing Normal Saline Irrigation 12/23/2019  8:30 AM   Periwound Protectant Not Applicable 12/23/2019  8:30 AM   Dressing Cleansing/Solutions Not Applicable 12/23/2019  8:30 AM   Dressing Changed New 12/17/2019  2:00 PM   Dressing Status Clean;Intact;Dry 12/23/2019  8:30 AM   Dressing Change Frequency Every 48 hrs 12/23/2019  8:30 AM   NEXT Dressing Change  12/24/19 12/23/2019  8:30 AM   NEXT Weekly Photo (Inpatient Only) 12/25/19 12/17/2019  8:59 PM   WOUND NURSE ONLY - Odor None 12/17/2019  2:00 PM   WOUND NURSE ONLY - Exposed Structures None 12/17/2019  2:00 PM   WOUND NURSE ONLY - Tissue Type and Percentage 10% yellow, 90% red 12/17/2019  2:00 PM       Wound 12/18/19 Incision Leg (Active)   Site Assessment DA 12/23/2019  8:30 AM   Violetta-wound Assessment DA 12/23/2019  8:30 AM   Margins DA 12/23/2019  8:30 AM   Closure DA 12/23/2019  8:30 AM   Drainage Amount DA 12/23/2019  8:30 AM   Drainage Description DA 12/23/2019  8:30 AM   Treatments Ice applied 12/18/2019  1:30 PM   Dressing Changed New 12/18/2019 12:30 PM   Dressing Status Dry;Intact;Clean 12/23/2019  8:30 AM      PICC Single Lumen 12/20/19 Left Cephalic (Active)   Site Assessment Clean;Dry;Intact 12/23/2019  8:30 AM   Line Status Flushed;Scrubbed the hub prior to access;Infusing 12/23/2019  8:30 AM   Line Secured at (cm) 0 cm 12/20/2019  1:09 PM   Extremity Circumference (cm) 33 cm 12/20/2019  1:09 PM   Dressing Type Biopatch;Occlusive;Securing device;Skin barrier 12/23/2019  8:30 AM   Dressing Status Clean;Dry;Intact 12/23/2019  8:30 AM   Dressing Intervention  N/A 12/23/2019  8:30 AM   Dressing Change Due 12/28/19 12/23/2019  8:30 AM   Date Primary Tubing Changed 12/21/19 12/23/2019  8:30 AM   Date Secondary Tubing Changed 12/23/19 12/23/2019  8:30 AM   Date IV Connector(s) Changed 12/21/19 12/23/2019  8:30 AM   NEXT Primary Tubing Change  12/24/19 12/23/2019  8:30 AM   NEXT Secondary Tubing Change  12/24/19 12/23/2019  8:30 AM   NEXT IV Connector(s) Change 12/28/19 12/23/2019  8:30 AM              MENTAL STATUS ON TRANSFER  Level of Consciousness: Alert  Orientation : Oriented x 4  Speech: Speech Clear    CONSULTATIONS  Orthopedic surgery    PROCEDURES  As above    LABORATORY  Lab Results   Component Value Date    SODIUM 137 12/21/2019    POTASSIUM 4.1 12/21/2019    CHLORIDE 105 12/21/2019    CO2 26 12/21/2019    GLUCOSE 96 12/21/2019    BUN 21 12/21/2019    CREATININE 0.66 12/21/2019      Lab Results   Component Value Date    WBC 10.6 12/21/2019    HEMOGLOBIN 14.5 12/21/2019    HEMATOCRIT 44.5 12/21/2019    PLATELETCT 320 12/21/2019      Total time of the discharge process exceeds 36 minutes.      Electronically signed by:  Tona Caba, MSN, RN, APRN, ACNPC-AG, CCRN  Nurse Practitioner, [facility]  Work # [phone]  Cell # [phone] (call, text, or tiger text)    12/23/2019    1:47 PM

## 2019-12-23 NOTE — CARE PLAN
Problem: Discharge Barriers/Planning  Goal: Patient's continuum of care needs will be met  Note:   Possible discharge to LTAC, pt verbalized understanding.      Problem: Pain Management  Goal: Pain level will decrease to patient's comfort goal  Note:   Due med given as ordered and reassessed after medicated.

## 2019-12-23 NOTE — PROGRESS NOTES
Assumed patient care at 0700. Received report from night shift. Assessment completed. A&Ox4, c/o 4/10 LLE pain, declines intervention at this time. Patient is up self, steady gait. Fall precautions in place; pt wearing treaded socks, personal possessions and call light placed within reach.  POC discussed with pt, communication board updated. Patient denies any additional needs at this time.

## 2019-12-23 NOTE — CARE PLAN
Problem: Infection  Goal: Will remain free from infection  Note:   Patient on iv abx until 01/14/2020. To complete therapy in LTAC.      Problem: Discharge Barriers/Planning  Goal: Patient's continuum of care needs will be met  Note:   Patient to discharge to LTAC this evening. Patient aware and agreeable to plan.

## 2019-12-23 NOTE — DISCHARGE INSTRUCTIONS
Discharge Instructions    Discharged to [facility] by medical transportation with escort. Discharged via wheelchair, hospital escort: Yes.  Special equipment needed: Not Applicable    Be sure to schedule a follow-up appointment with your primary care doctor or any specialists as instructed.     Discharge Plan:   Influenza Vaccine Indication: Patient Refuses    I understand that a diet low in cholesterol, fat, and sodium is recommended for good health. Unless I have been given specific instructions below for another diet, I accept this instruction as my diet prescription.   Other diet: Regular    Special Instructions: None    · Is patient discharged on Warfarin / Coumadin?   No     Depression / Suicide Risk    As you are discharged from this [facility] facility, it is important to learn how to keep safe from harming yourself.    Recognize the warning signs:  · Abrupt changes in personality, positive or negative- including increase in energy   · Giving away possessions  · Change in eating patterns- significant weight changes-  positive or negative  · Change in sleeping patterns- unable to sleep or sleeping all the time   · Unwillingness or inability to communicate  · Depression  · Unusual sadness, discouragement and loneliness  · Talk of wanting to die  · Neglect of personal appearance   · Rebelliousness- reckless behavior  · Withdrawal from people/activities they love  · Confusion- inability to concentrate     If you or a loved one observes any of these behaviors or has concerns about self-harm, here's what you can do:  · Talk about it- your feelings and reasons for harming yourself  · Remove any means that you might use to hurt yourself (examples: pills, rope, extension cords, firearm)  · Get professional help from the community (Mental Health, Substance Abuse, psychological counseling)  · Do not be alone:Call your Safe Contact- someone whom you trust who will be there for you.  · Call your local CRISIS HOTLINE 476-7774  or 703-647-7998  · Call your local Children's Mobile Crisis Response Team [place] (477) 875-4050 or www.OrganizedWisdom  · Call the toll free National Suicide Prevention Hotlines   · National Suicide Prevention Lifeline 781-609-DTLF (3914)  · National Hope Line Network 800-SUICIDE (764-4745)      Cellulitis, Adult  Introduction  Cellulitis is a skin infection. The infected area is usually red and sore. This condition occurs most often in the arms and lower legs. It is very important to get treated for this condition.  Follow these instructions at home:  · Take over-the-counter and prescription medicines only as told by your doctor.  · If you were prescribed an antibiotic medicine, take it as told by your doctor. Do not stop taking the antibiotic even if you start to feel better.  · Drink enough fluid to keep your pee (urine) clear or pale yellow.  · Do not touch or rub the infected area.  · Raise (elevate) the infected area above the level of your heart while you are sitting or lying down.  · Place warm or cold wet cloths (warm or cold compresses) on the infected area. Do this as told by your doctor.  · Keep all follow-up visits as told by your doctor. This is important. These visits let your doctor make sure your infection is not getting worse.  Contact a doctor if:  · You have a fever.  · Your symptoms do not get better after 1-2 days of treatment.  · Your bone or joint under the infected area starts to hurt after the skin has healed.  · Your infection comes back. This can happen in the same area or another area.  · You have a swollen bump in the infected area.  · You have new symptoms.  · You feel ill and also have muscle aches and pains.  Get help right away if:  · Your symptoms get worse.  · You feel very sleepy.  · You throw up (vomit) or have watery poop (diarrhea) for a long time.  · There are red streaks coming from the infected area.  · Your red area gets larger.  · Your red area turns darker.  This  information is not intended to replace advice given to you by your health care provider. Make sure you discuss any questions you have with your health care provider.  Document Released: 06/05/2009 Document Revised: 05/25/2017 Document Reviewed: 10/26/2016  © 2017 [employer]    Oxycodone tablets or capsules  What is this medicine?  OXYCODONE (ox i KOE done) is a pain reliever. It is used to treat moderate to severe pain.  This medicine may be used for other purposes; ask your health care provider or pharmacist if you have questions.  COMMON BRAND NAME(S): Dazidox, Endocodone, Oxaydo, OXECTA, OxyIR, Percolone, Roxicodone, ROXYBOND  What should I tell my health care provider before I take this medicine?  They need to know if you have any of these conditions:  -Fidel's disease  -brain tumor  -head injury  -heart disease  -history of drug or alcohol abuse problem  -if you often drink alcohol  -kidney disease  -liver disease  -lung or breathing disease, like asthma  -mental illness  -pancreatic disease  -seizures  -thyroid disease  -an unusual or allergic reaction to oxycodone, codeine, hydrocodone, morphine, other medicines, foods, dyes, or preservatives  -pregnant or trying to get pregnant  -breast-feeding  How should I use this medicine?  Take this medicine by mouth with a glass of water. Follow the directions on the prescription label. You can take it with or without food. If it upsets your stomach, take it with food. Take your medicine at regular intervals. Do not take it more often than directed. Do not stop taking except on your doctor's advice.  Some brands of this medicine, like Oxecta, have special instructions. Ask your doctor or pharmacist if these directions are for you: Do not cut, crush or chew this medicine. Swallow only one tablet at a time. Do not wet, soak, or lick the tablet before you take it.  A special MedGuide will be given to you by the pharmacist with each prescription and refill. Be sure to read  this information carefully each time.  Talk to your pediatrician regarding the use of this medicine in children. Special care may be needed.  Overdosage: If you think you have taken too much of this medicine contact a poison control center or emergency room at once.  NOTE: This medicine is only for you. Do not share this medicine with others.  What if I miss a dose?  If you miss a dose, take it as soon as you can. If it is almost time for your next dose, take only that dose. Do not take double or extra doses.  What may interact with this medicine?  This medicine may interact with the following medications:  -alcohol  -antihistamines for allergy, cough and cold  -antiviral medicines for HIV or AIDS  -atropine  -certain antibiotics like clarithromycin, erythromycin, linezolid, rifampin  -certain medicines for anxiety or sleep  -certain medicines for bladder problems like oxybutynin, tolterodine  -certain medicines for depression like amitriptyline, fluoxetine, sertraline  -certain medicines for fungal infections like ketoconazole, itraconazole, voriconazole  -certain medicines for migraine headache like almotriptan, eletriptan, frovatriptan, naratriptan, rizatriptan, sumatriptan, zolmitriptan  -certain medicines for nausea or vomiting like dolasetron, ondansetron, palonosetron  -certain medicines for Parkinson's disease like benztropine, trihexyphenidyl  -certain medicines for seizures like phenobarbital, phenytoin, primidone  -certain medicines for stomach problems like dicyclomine, hyoscyamine  -certain medicines for travel sickness like scopolamine  -diuretics  -general anesthetics like halothane, isoflurane, methoxyflurane, propofol  -ipratropium  -local anesthetics like lidocaine, pramoxine, tetracaine  -MAOIs like Carbex, Eldepryl, Marplan, Nardil, and Parnate  -medicines that relax muscles for surgery  -methylene blue  -nilotinib  -other narcotic medicines for pain or cough  -phenothiazines like chlorpromazine,  mesoridazine, prochlorperazine, thioridazine  This list may not describe all possible interactions. Give your health care provider a list of all the medicines, herbs, non-prescription drugs, or dietary supplements you use. Also tell them if you smoke, drink alcohol, or use illegal drugs. Some items may interact with your medicine.  What should I watch for while using this medicine?  Tell your doctor or health care professional if your pain does not go away, if it gets worse, or if you have new or a different type of pain. You may develop tolerance to the medicine. Tolerance means that you will need a higher dose of the medicine for pain relief. Tolerance is normal and is expected if you take this medicine for a long time.  Do not suddenly stop taking your medicine because you may develop a severe reaction. Your body becomes used to the medicine. This does NOT mean you are addicted. Addiction is a behavior related to getting and using a drug for a non-medical reason. If you have pain, you have a medical reason to take pain medicine. Your doctor will tell you how much medicine to take. If your doctor wants you to stop the medicine, the dose will be slowly lowered over time to avoid any side effects.  There are different types of narcotic medicines (opiates). If you take more than one type at the same time or if you are taking another medicine that also causes drowsiness, you may have more side effects. Give your health care provider a list of all medicines you use. Your doctor will tell you how much medicine to take. Do not take more medicine than directed. Call emergency for help if you have problems breathing or unusual sleepiness.  You may get drowsy or dizzy. Do not drive, use machinery, or do anything that needs mental alertness until you know how the medicine affects you. Do not stand or sit up quickly, especially if you are an older patient. This reduces the risk of dizzy or fainting spells. Alcohol may  interfere with the effect of this medicine. Avoid alcoholic drinks.  This medicine will cause constipation. Try to have a bowel movement at least every 2 to 3 days. If you do not have a bowel movement for 3 days, call your doctor or health care professional.  Your mouth may get dry. Chewing sugarless gum or sucking hard candy, and drinking plenty of water may help. Contact your doctor if the problem does not go away or is severe.  What side effects may I notice from receiving this medicine?  Side effects that you should report to your doctor or health care professional as soon as possible:  -allergic reactions like skin rash, itching or hives, swelling of the face, lips, or tongue  -breathing problems  -confusion  -signs and symptoms of low blood pressure like dizziness; feeling faint or lightheaded, falls; unusually weak or tired  -trouble passing urine or change in the amount of urine  -trouble swallowing  Side effects that usually do not require medical attention (report to your doctor or health care professional if they continue or are bothersome):  -constipation  -dry mouth  -nausea, vomiting  -tiredness  This list may not describe all possible side effects. Call your doctor for medical advice about side effects. You may report side effects to FDA at 8-431-FDA-5441.  Where should I keep my medicine?  Keep out of the reach of children. This medicine can be abused. Keep your medicine in a safe place to protect it from theft. Do not share this medicine with anyone. Selling or giving away this medicine is dangerous and against the law.  Store at room temperature between 15 and 30 degrees C (59 and 86 degrees F). Protect from light. Keep container tightly closed.  This medicine may cause accidental overdose and death if it is taken by other adults, children, or pets. Flush any unused medicine down the toilet to reduce the chance of harm. Do not use the medicine after the expiration date.  NOTE: This sheet is a  summary. It may not cover all possible information. If you have questions about this medicine, talk to your doctor, pharmacist, or health care provider.  © 2018 [employer] (2016-11-01 16:55:57)

## 2019-12-23 NOTE — PROGRESS NOTES
Patient discharged to [facility] facility. PICC left in place to [facility]. Prescriptions sent with patient. Discharge instructions given specifically involving: diagnosis, new/continued medications. Patient verbalized understanding, 2nd copy of AVS signed and placed in patient chart. Patient verbalizes all belongings are accounted for prior to leaving unit. Report given to [facility].

## 2019-12-23 NOTE — DISCHARGE PLANNING
Anticipated Discharge Disposition: [facility]     Action: Per [facility] Liaison for [facility], bed available today. Pt to transfer to [facility] today via [facility]. PCS form faxed to Kiet BRYANT to set up transport. Accepting MD is Dr De Jesus.      Barriers to Discharge: None     Plan: Transport to [facility] via [facility] at 17:30. BSN aware. Chart copy and COBRA given to BSN.

## 2019-12-23 NOTE — DISCHARGE PLANNING
Anticipated Discharge Disposition: [facility]    Action: LSW contacted [facility] Liaison for [facility]. Per [facility]ia, possible bed available this afternoon. [facility]ia to confirm and update LSW.    Barriers to Discharge: None    Plan: Await bed availability

## 2019-12-23 NOTE — DISCHARGE PLANNING
Received Transport Form @ 1625  Spoke to Ashleigh @ [facility]    Transport is scheduled for 12/23 @1730 going to [facility].  SW informed.    @1445  Agency/Facility Name: [facility]  Spoke To: Ashleigh  Outcome: Cancelled transport.    @1440  Agency/Facility Name: [facility]  Spoke To: Sadia  Outcome: They will not be ready to take him yet.    Received Transport Form @ 1440  Spoke to Ashleigh @ [facility]    Transport is scheduled for 12/23 @1600 going to [facility].  FIORELLA informed.    Agency/Facility Name: [facility]  Spoke To: Meghan  Outcome: Got auth.

## 2019-12-24 NOTE — DISCHARGE PLANNING
MSW received call from [facility].They are going to be delayed for the transfer to [facility]. MSW updated charge RN.

## 2020-01-08 ENCOUNTER — HOSPITAL ENCOUNTER (INPATIENT)
Facility: REHABILITATION | Age: 55
End: 2020-01-08
Attending: PHYSICAL MEDICINE & REHABILITATION | Admitting: PHYSICAL MEDICINE & REHABILITATION
Payer: MEDICAID

## (undated) DEVICE — GLOVE BIOGEL SZ 7.5 SURGICAL PF LTX - (50PR/BX 4BX/CA)

## (undated) DEVICE — SUTURE GENERAL

## (undated) DEVICE — PADDING CAST 6 IN STERILE - 6 X 4 YDS (24/CA)

## (undated) DEVICE — CHLORAPREP 26 ML APPLICATOR - ORANGE TINT(25/CA)

## (undated) DEVICE — WATER IRRIG. STER. 1500 ML - (9/CA)

## (undated) DEVICE — SENSOR SPO2 NEO LNCS ADHESIVE (20/BX) SEE USER NOTES

## (undated) DEVICE — TUBING CLEARLINK DUO-VENT - C-FLO (48EA/CA)

## (undated) DEVICE — NEPTUNE 4 PORT MANIFOLD - (20/PK)

## (undated) DEVICE — MASK ANESTHESIA ADULT  - (100/CA)

## (undated) DEVICE — HEAD HOLDER JUNIOR/ADULT

## (undated) DEVICE — GLOVE BIOGEL INDICATOR SZ 8 SURGICAL PF LTX - (50/BX 4BX/CA)

## (undated) DEVICE — PACK MAJOR ORTHO - (2EA/CA)

## (undated) DEVICE — CANISTER SUCTION 3000ML MECHANICAL FILTER AUTO SHUTOFF MEDI-VAC NONSTERILE LF DISP  (40EA/CA)

## (undated) DEVICE — ELECTRODE DUAL RETURN W/ CORD - (50/PK)

## (undated) DEVICE — DRAPE LOWER EXTREMETY - (6/CA)

## (undated) DEVICE — KIT ANESTHESIA W/CIRCUIT & 3/LT BAG W/FILTER (20EA/CA)

## (undated) DEVICE — SODIUM CHL IRRIGATION 0.9% 1000ML (12EA/CA)

## (undated) DEVICE — BANDAGE ELASTIC 4 HONEYCOMB - 4"X5YD LF (20/CA)"

## (undated) DEVICE — SUTURE 2-0 VICRYL PLUS CT-1 - 8 X 18 INCH(12/BX)

## (undated) DEVICE — SUTURE 0 VICRYL PLUS CT-1 - 8 X 18 INCH (12/BX)

## (undated) DEVICE — GOWN WARMING STANDARD FLEX - (30/CA)

## (undated) DEVICE — PROTECTOR ULNA NERVE - (36PR/CA)

## (undated) DEVICE — ELECTRODE 850 FOAM ADHESIVE - HYDROGEL RADIOTRNSPRNT (50/PK)

## (undated) DEVICE — BLADE SURGICAL #15 - (50/BX 3BX/CA)

## (undated) DEVICE — SUCTION INSTRUMENT YANKAUER BULBOUS TIP W/O VENT (50EA/CA)

## (undated) DEVICE — SET EXTENSION WITH 2 PORTS (48EA/CA) ***PART #2C8610 IS A SUBSTITUTE*****

## (undated) DEVICE — PADDING CAST 4 IN STERILE - 4 X 4 YDS (24/CA)

## (undated) DEVICE — KIT ROOM DECONTAMINATION

## (undated) DEVICE — LACTATED RINGERS INJ 1000 ML - (14EA/CA 60CA/PF)

## (undated) DEVICE — BANDAGE ELASTIC 6 HONEYCOMB - 6X5YD LF (20/CA)"

## (undated) DEVICE — SET LEADWIRE 5 LEAD BEDSIDE DISPOSABLE ECG (1SET OF 5/EA)

## (undated) DEVICE — SLEEVE, VASO, THIGH, MED